# Patient Record
Sex: MALE | Race: WHITE | NOT HISPANIC OR LATINO | Employment: FULL TIME | URBAN - METROPOLITAN AREA
[De-identification: names, ages, dates, MRNs, and addresses within clinical notes are randomized per-mention and may not be internally consistent; named-entity substitution may affect disease eponyms.]

---

## 2017-01-09 ENCOUNTER — TELEPHONE (OUTPATIENT)
Dept: FAMILY MEDICINE CLINIC | Facility: CLINIC | Age: 37
End: 2017-01-09

## 2017-01-09 ENCOUNTER — OFFICE VISIT (OUTPATIENT)
Dept: FAMILY MEDICINE CLINIC | Facility: CLINIC | Age: 37
End: 2017-01-09

## 2017-01-09 VITALS
OXYGEN SATURATION: 98 % | DIASTOLIC BLOOD PRESSURE: 86 MMHG | SYSTOLIC BLOOD PRESSURE: 128 MMHG | WEIGHT: 143 LBS | HEIGHT: 66 IN | BODY MASS INDEX: 22.98 KG/M2 | HEART RATE: 114 BPM

## 2017-01-09 DIAGNOSIS — T14.8XXA BRUISING: ICD-10-CM

## 2017-01-09 DIAGNOSIS — R42 DIZZINESS: ICD-10-CM

## 2017-01-09 DIAGNOSIS — M62.838 MUSCLE SPASM: Primary | ICD-10-CM

## 2017-01-09 PROCEDURE — 99212 OFFICE O/P EST SF 10 MIN: CPT | Performed by: FAMILY MEDICINE

## 2017-01-09 NOTE — PROGRESS NOTES
Subjective   Nick Beatty is a 36 y.o. male.     History of Present Illness     He fell and hurt his back from a dizzy spell -- hit his side on a door. He's been using a heating pad. Very tight muscle. He didn't spin. More lightheadedness. Lasted for a couple of days and it went away. Didn't check his BP but his pulse was not racing. Turned as he was falling and his back hit the door knob. Then he fell on the tile floor. Having just a little pain in the right lower part of his back. 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 He's been walking around the house and the muscles are loosening up. He has used a couple of naprosyn. He thinks he'll probably be fine by Wednesday. ROM was OK.     The following portions of the patient's history were reviewed and updated as appropriate: allergies, current medications, past social history and problem list.    Review of Systems   Constitutional: Negative for activity change, appetite change, chills, fatigue, fever and unexpected weight change.   HENT: Negative for congestion, ear pain, hearing loss, mouth sores, nosebleeds, rhinorrhea and sore throat.    Eyes: Negative for pain and visual disturbance.   Respiratory: Negative for cough, shortness of breath and wheezing.    Cardiovascular: Negative for chest pain, palpitations and leg swelling.   Gastrointestinal: Positive for nausea and vomiting. Negative for abdominal distention, abdominal pain, blood in stool, constipation and diarrhea.   Endocrine: Negative for cold intolerance and heat intolerance.   Genitourinary: Negative for difficulty urinating, discharge, dysuria, frequency, hematuria and urgency.   Musculoskeletal: Positive for back pain. Negative for joint swelling.   Skin: Negative for rash and wound.   Neurological: Positive for dizziness. Negative for weakness, numbness and headaches.   Hematological: Does not bruise/bleed easily.   Psychiatric/Behavioral: Negative for confusion, dysphoric mood,  "sleep disturbance and suicidal ideas. The patient is not nervous/anxious.        Objective   Visit Vitals   • /86   • Pulse 114   • Ht 66\" (167.6 cm)   • Wt 143 lb (64.9 kg)   • SpO2 98%   • BMI 23.08 kg/m2     Physical Exam   Constitutional: He appears well-developed and well-nourished.   Musculoskeletal: He exhibits tenderness (Right outer flank along the perispinal muscle. No bruising. ). He exhibits no edema.   Psychiatric: He has a normal mood and affect. His behavior is normal. Judgment and thought content normal.   Vitals reviewed.      Assessment/Plan   Problem List Items Addressed This Visit     None      Visit Diagnoses     Muscle spasm    -  Primary    Bruising        Dizziness--resolved                 "

## 2017-01-09 NOTE — TELEPHONE ENCOUNTER
RF request for Lomarisol.  SOFIA has been placed on your desk for review.  Last Filled: 03.2016  Last OV: 12.30.16

## 2017-01-09 NOTE — MR AVS SNAPSHOT
Nick Rogerio   1/9/2017 3:45 PM   Office Visit    Provider:  Norah Newman MD   Department:  [facility] PRIMARY CARE   Dept Phone:  [phone]                Your Full Care Plan              Your Updated Medication List          This list is accurate as of: 1/9/17  4:30 PM.  Always use your most recent med list.                * gabapentin 100 MG capsule   Commonly known as:  NEURONTIN       * gabapentin 400 MG capsule   Commonly known as:  NEURONTIN   Take 1 capsule by mouth Every Night.       LORazepam 1 MG tablet   Commonly known as:  ATIVAN   Take 1 tablet (1 mg total) by mouth every 8 (eight) hours as needed for anxiety.       MULTIPLE VITAMIN PO       ondansetron 8 MG tablet   Commonly known as:  ZOFRAN   Take 1 tablet by mouth every 12 (twelve) hours as needed for nausea or vomiting.       promethazine 25 MG tablet   Commonly known as:  PHENERGAN       * Notice:  This list has 2 medication(s) that are the same as other medications prescribed for you. Read the directions carefully, and ask your doctor or other care provider to review them with you.            Medications to be Given to You by a Medical Professional     Due       Frequency    12/29/2016 cyanocobalamin injection 1,000 mcg  Every 30 Days      Instructions     None    Patient Instructions History      [employer] Signup     Our records indicate that you have an active [facility] account.    You can view your After Visit Summary by going to [facility] and logging in with your [employer] username and password.  If you don't have a [employer] username and password but a parent or guardian has access to your record, the parent or guardian should login with their own [employer] username and password and access your record to view the After Visit Summary.    If you have questions, you can email [email or web address] or call [phone] to talk to our [employer] staff.  Remember, [employer] is  "NOT to be used for urgent needs.  For medical emergencies, dial 911.               Other Info from Your Visit           Allergies     No Known Allergies      Reason for Visit     Back Pain from fall x 5 days       Vital Signs     Blood Pressure Pulse Height Weight Oxygen Saturation Body Mass Index    128/86 114 66\" (167.6 cm) 143 lb (64.9 kg) 98% 23.08 kg/m2    Smoking Status                   Current Every Day Smoker           "

## 2017-01-09 NOTE — LETTER
January 9, 2017     Patient: Nick Beatty   YOB: 1980   Date of Visit: 1/9/2017       To Whom It May Concern:    It is my medical opinion that Nick Beatty should remain out of work until Wednesday January 11. This encompasses Wednesday January 4 through January 10.       Sincerely,      Norah Newman MD    CC: No Recipients

## 2017-01-10 DIAGNOSIS — F41.8 SITUATIONAL ANXIETY: ICD-10-CM

## 2017-01-10 RX ORDER — LORAZEPAM 1 MG/1
1 TABLET ORAL EVERY 8 HOURS PRN
Qty: 10 TABLET | Refills: 0 | OUTPATIENT
Start: 2017-01-10 | End: 2019-01-24

## 2017-01-12 DIAGNOSIS — R11.0 CHRONIC NAUSEA: ICD-10-CM

## 2017-01-12 RX ORDER — ONDANSETRON HYDROCHLORIDE 8 MG/1
8 TABLET, FILM COATED ORAL EVERY 12 HOURS PRN
Qty: 30 TABLET | Refills: 0 | Status: SHIPPED | OUTPATIENT
Start: 2017-01-12 | End: 2018-03-28 | Stop reason: SDUPTHER

## 2017-04-03 ENCOUNTER — TELEPHONE (OUTPATIENT)
Dept: FAMILY MEDICINE CLINIC | Facility: CLINIC | Age: 37
End: 2017-04-03

## 2017-04-03 DIAGNOSIS — D51.0 PERNICIOUS ANEMIA: Primary | ICD-10-CM

## 2017-04-03 NOTE — TELEPHONE ENCOUNTER
04.03.17 [facility]  Patient informed.    ----- Message from Norah Newman MD sent at 4/3/2017 12:46 PM EDT -----  Regarding: RE: repeat B12 level  Please call and tell the patient that he needs a repeat B12 level.   ----- Message -----     From: Norah Newman MD     Sent: 4/3/2017       To: Norah Newman MD  Subject: repeat B12 level

## 2017-09-28 DIAGNOSIS — G62.9 NEUROPATHY: ICD-10-CM

## 2017-10-03 RX ORDER — GABAPENTIN 400 MG/1
CAPSULE ORAL
Qty: 90 CAPSULE | Refills: 1 | OUTPATIENT
Start: 2017-10-03 | End: 2018-05-22 | Stop reason: SDUPTHER

## 2018-03-21 ENCOUNTER — OFFICE VISIT (OUTPATIENT)
Dept: FAMILY MEDICINE CLINIC | Facility: CLINIC | Age: 38
End: 2018-03-21

## 2018-03-21 VITALS
HEIGHT: 66 IN | WEIGHT: 134 LBS | SYSTOLIC BLOOD PRESSURE: 124 MMHG | DIASTOLIC BLOOD PRESSURE: 82 MMHG | HEART RATE: 65 BPM | BODY MASS INDEX: 21.53 KG/M2 | OXYGEN SATURATION: 98 %

## 2018-03-21 DIAGNOSIS — R55 SYNCOPE, UNSPECIFIED SYNCOPE TYPE: Primary | ICD-10-CM

## 2018-03-21 DIAGNOSIS — I44.7 LEFT BUNDLE BRANCH BLOCK: ICD-10-CM

## 2018-03-21 PROCEDURE — 99214 OFFICE O/P EST MOD 30 MIN: CPT | Performed by: NURSE PRACTITIONER

## 2018-03-21 RX ORDER — MECLIZINE HCL 12.5 MG/1
12.5 TABLET ORAL 2 TIMES DAILY
Qty: 20 TABLET | Refills: 0 | Status: SHIPPED | OUTPATIENT
Start: 2018-03-21 | End: 2018-04-02 | Stop reason: SDUPTHER

## 2018-03-21 NOTE — PROGRESS NOTES
Nick Beatty is a 37 y.o. male.Patient has had dizzy spells and has been light headed for 2 weeks. He blacked out at work last week. Patient was seen at an urgent care and had blood work and an EKG.Feels like the room is spinning.Has a couple spells a day, is unable to go to work because he cannot drive.Is asking for a note for work to cover the weekend.      Assessment/Plan   Problem List Items Addressed This Visit     None      Visit Diagnoses     Syncope, unspecified syncope type    -  Primary    Left bundle branch block        Relevant Orders    Ambulatory Referral to Cardiology             No Follow-up on file.  Patient Instructions   Syncope  Syncope is when you temporarily lose consciousness. Syncope may also be called fainting or passing out. It is caused by a sudden decrease in blood flow to the brain. Even though most causes of syncope are not dangerous, syncope can be a sign of a serious medical problem. Signs that you may be about to faint include:  · Feeling dizzy or light-headed.  · Feeling nauseous.  · Seeing all white or all black in your field of vision.  · Having cold, clammy skin.  If you fainted, get medical help right away.Call your local emergency services (911 in the [place] Do not drive yourself to the hospital.  Follow these instructions at home:  Pay attention to any changes in your symptoms. Take these actions to help with your condition:  · Have someone stay with you until you feel stable.  · Do not drive, use machinery, or play sports until your health care provider says it is okay.  · Keep all follow-up visits as told by your health care provider. This is important.  · If you start to feel like you might faint, lie down right away and raise (elevate) your feet above the level of your heart. Breathe deeply and steadily. Wait until all of the symptoms have passed.  · Drink enough fluid to keep your urine clear or pale yellow.  · If you are taking blood pressure or heart medicine, get up  "slowly and take several minutes to sit and then stand. This can reduce dizziness.  · Take over-the-counter and prescription medicines only as told by your health care provider.  Get help right away if:  · You have a severe headache.  · You have unusual pain in your chest, abdomen, or back.  · You are bleeding from your mouth or rectum, or you have black or tarry stool.  · You have a very fast or irregular heartbeat (palpitations).  · You have pain with breathing.  · You faint once or repeatedly.  · You have a seizure.  · You are confused.  · You have trouble walking.  · You have severe weakness.  · You have vision problems.  These symptoms may represent a serious problem that is an emergency. Do not wait to see if your symptoms will go away. Get medical help right away. Call your local emergency services (911 in the [place] Do not drive yourself to the hospital.  This information is not intended to replace advice given to you by your health care provider. Make sure you discuss any questions you have with your health care provider.  Document Released: 12/18/2006 Document Revised: 05/25/2017 Document Reviewed: 08/31/2016  [employer] Interactive Patient Education © 2017 [employer]        Chief Complaint   Patient presents with   • Dizziness     Social History   Substance Use Topics   • Smoking status: Current Every Day Smoker     Types: Electronic Cigarette   • Smokeless tobacco: Never Used   • Alcohol use No       History of Present Illness     The following portions of the patient's history were reviewed and updated as appropriate:PMHroutine: Social history , Allergies, Current Medications and Active Problem List    Review of Systems   Constitutional: Negative for activity change and appetite change.   Neurological: Positive for dizziness and light-headedness.       Objective   Vitals:    03/21/18 1400   BP: 124/82   Pulse: 65   SpO2: 98%   Weight: 60.8 kg (134 lb)   Height: 167.6 cm (66\")     Body mass index is " 21.63 kg/m².  Physical Exam   Constitutional: He is oriented to person, place, and time. He appears well-developed and well-nourished. He appears distressed.   HENT:   Head: Normocephalic and atraumatic.   Right Ear: External ear normal.   Left Ear: External ear normal.   Mouth/Throat: Oropharynx is clear and moist.   Eyes: EOM are normal. Pupils are equal, round, and reactive to light.   Cardiovascular: Normal rate and regular rhythm.    Pulmonary/Chest: Effort normal and breath sounds normal.   Abdominal: Soft.   Musculoskeletal: Normal range of motion.   Neurological: He is alert and oriented to person, place, and time. He has normal reflexes. He displays normal reflexes. No cranial nerve deficit. He exhibits normal muscle tone. Coordination normal.   Skin: Skin is warm.   Psychiatric:   Anxious    Nursing note and vitals reviewed.    Reviewed Data:  No visits with results within 1 Month(s) from this visit.   Latest known visit with results is:   Office Visit on 12/30/2016   Component Date Value Ref Range Status   • Vitamin B-12 12/31/2016 >2000* 211 - 946 pg/mL Final   • Glucose 12/31/2016 81  65 - 99 mg/dL Final   • BUN 12/31/2016 13  6 - 20 mg/dL Final   • Creatinine 12/31/2016 0.53* 0.76 - 1.27 mg/dL Final   • eGFR Non African Am 12/31/2016 137  >59 mL/min/1.73 Final   • eGFR African Am 12/31/2016 158  >59 mL/min/1.73 Final   • BUN/Creatinine Ratio 12/31/2016 25* 8 - 19 Final   • Sodium 12/31/2016 143  134 - 144 mmol/L Final   • Potassium 12/31/2016 3.7  3.5 - 5.2 mmol/L Final   • Chloride 12/31/2016 104  96 - 106 mmol/L Final   • Total CO2 12/31/2016 24  18 - 29 mmol/L Final   • Calcium 12/31/2016 9.3  8.7 - 10.2 mg/dL Final   • Total Protein 12/31/2016 6.6  6.0 - 8.5 g/dL Final   • Albumin 12/31/2016 4.2  3.5 - 5.5 g/dL Final   • Globulin 12/31/2016 2.4  1.5 - 4.5 g/dL Final   • A/G Ratio 12/31/2016 1.8  1.1 - 2.5 Final   • Total Bilirubin 12/31/2016 0.5  0.0 - 1.2 mg/dL Final   • Alkaline Phosphatase  12/31/2016 99  39 - 117 IU/L Final   • AST (SGOT) 12/31/2016 37  0 - 40 IU/L Final   • ALT (SGPT) 12/31/2016 65* 0 - 44 IU/L Final   • Total Cholesterol 12/31/2016 164  100 - 199 mg/dL Final   • Triglycerides 12/31/2016 61  0 - 149 mg/dL Final   • HDL Cholesterol 12/31/2016 50  >39 mg/dL Final   • VLDL Cholesterol 12/31/2016 12  5 - 40 mg/dL Final   • LDL Cholesterol  12/31/2016 102* 0 - 99 mg/dL Final   • LDL/HDL Ratio 12/31/2016 2.0  0.0 - 3.6 ratio units Final    Comment:                                     LDL/HDL Ratio                                              Men  Women                                1/2 Avg.Risk  1.0    1.5                                    Avg.Risk  3.6    3.2                                 2X Avg.Risk  6.2    5.0                                 3X Avg.Risk  8.0    6.1     • Please note 12/31/2016 Comment   Final    Comment: The date and/or time of collection was not indicated on the  requisition as required by state and federal law.  The date of  receipt of the specimen was used as the collection date if not  supplied.     pt is new to me he usually sees Dr Newman, difficult to get a handle on what is going on when we discussed his syncopal episode.  Pt is very concerned that the cause of the episode is his heart, he is anxious during the visit, reports he cannot go to work like this, Review of his visit to the [facility] with him and his EKG. Will refer him back to cardiology and he will need to follow up with Dr Newman.

## 2018-03-22 NOTE — PATIENT INSTRUCTIONS
Syncope  Syncope is when you temporarily lose consciousness. Syncope may also be called fainting or passing out. It is caused by a sudden decrease in blood flow to the brain. Even though most causes of syncope are not dangerous, syncope can be a sign of a serious medical problem. Signs that you may be about to faint include:  · Feeling dizzy or light-headed.  · Feeling nauseous.  · Seeing all white or all black in your field of vision.  · Having cold, clammy skin.  If you fainted, get medical help right away.Call your local emergency services (911 in the [place] Do not drive yourself to the hospital.  Follow these instructions at home:  Pay attention to any changes in your symptoms. Take these actions to help with your condition:  · Have someone stay with you until you feel stable.  · Do not drive, use machinery, or play sports until your health care provider says it is okay.  · Keep all follow-up visits as told by your health care provider. This is important.  · If you start to feel like you might faint, lie down right away and raise (elevate) your feet above the level of your heart. Breathe deeply and steadily. Wait until all of the symptoms have passed.  · Drink enough fluid to keep your urine clear or pale yellow.  · If you are taking blood pressure or heart medicine, get up slowly and take several minutes to sit and then stand. This can reduce dizziness.  · Take over-the-counter and prescription medicines only as told by your health care provider.  Get help right away if:  · You have a severe headache.  · You have unusual pain in your chest, abdomen, or back.  · You are bleeding from your mouth or rectum, or you have black or tarry stool.  · You have a very fast or irregular heartbeat (palpitations).  · You have pain with breathing.  · You faint once or repeatedly.  · You have a seizure.  · You are confused.  · You have trouble walking.  · You have severe weakness.  · You have vision problems.  These symptoms  may represent a serious problem that is an emergency. Do not wait to see if your symptoms will go away. Get medical help right away. Call your local emergency services (911 in the [place] Do not drive yourself to the hospital.  This information is not intended to replace advice given to you by your health care provider. Make sure you discuss any questions you have with your health care provider.  Document Released: 12/18/2006 Document Revised: 05/25/2017 Document Reviewed: 08/31/2016  [employer] Interactive Patient Education © 2017 [employer]

## 2018-03-28 DIAGNOSIS — R11.0 CHRONIC NAUSEA: ICD-10-CM

## 2018-03-28 RX ORDER — ONDANSETRON HYDROCHLORIDE 8 MG/1
8 TABLET, FILM COATED ORAL EVERY 12 HOURS PRN
Qty: 30 TABLET | Refills: 0 | Status: SHIPPED | OUTPATIENT
Start: 2018-03-28 | End: 2018-05-23 | Stop reason: SDUPTHER

## 2018-03-30 DIAGNOSIS — F41.8 SITUATIONAL ANXIETY: ICD-10-CM

## 2018-03-30 RX ORDER — LORAZEPAM 1 MG/1
1 TABLET ORAL EVERY 8 HOURS PRN
Qty: 10 TABLET | Refills: 0 | OUTPATIENT
Start: 2018-03-30

## 2018-04-02 ENCOUNTER — OFFICE VISIT (OUTPATIENT)
Dept: FAMILY MEDICINE CLINIC | Facility: CLINIC | Age: 38
End: 2018-04-02

## 2018-04-02 VITALS
HEIGHT: 67 IN | WEIGHT: 136.1 LBS | OXYGEN SATURATION: 98 % | TEMPERATURE: 98.7 F | SYSTOLIC BLOOD PRESSURE: 120 MMHG | RESPIRATION RATE: 16 BRPM | BODY MASS INDEX: 21.36 KG/M2 | HEART RATE: 91 BPM | DIASTOLIC BLOOD PRESSURE: 78 MMHG

## 2018-04-02 DIAGNOSIS — R55 NEAR SYNCOPE: ICD-10-CM

## 2018-04-02 DIAGNOSIS — E53.8 B12 DEFICIENCY: ICD-10-CM

## 2018-04-02 DIAGNOSIS — R42 VERTIGO: Primary | ICD-10-CM

## 2018-04-02 PROBLEM — I44.7 LEFT BUNDLE BRANCH BLOCK: Status: ACTIVE | Noted: 2018-04-02

## 2018-04-02 PROCEDURE — 99213 OFFICE O/P EST LOW 20 MIN: CPT | Performed by: FAMILY MEDICINE

## 2018-04-02 RX ORDER — UBIDECARENONE 75 MG
100 CAPSULE ORAL DAILY
COMMUNITY

## 2018-04-02 RX ORDER — MECLIZINE HCL 12.5 MG/1
12.5 TABLET ORAL 2 TIMES DAILY
Qty: 20 TABLET | Refills: 0 | OUTPATIENT
Start: 2018-04-02

## 2018-04-02 RX ORDER — MECLIZINE HCL 12.5 MG/1
12.5 TABLET ORAL 2 TIMES DAILY
Qty: 40 TABLET | Refills: 0 | Status: SHIPPED | OUTPATIENT
Start: 2018-04-02 | End: 2018-04-24 | Stop reason: SDUPTHER

## 2018-04-02 NOTE — ASSESSMENT & PLAN NOTE
Lillie 4/2/2018  This has not been recently checked. Will have him return to office for labs since [facility] has already left for the day.

## 2018-04-02 NOTE — PROGRESS NOTES
Nick Beatty is a 37 y.o. male.      Assessment/Plan   Problem List Items Addressed This Visit        Digestive    B12 deficiency    Current Assessment & Plan     Lillie 4/2/2018  This has not been recently checked. Will have him return to office for labs since [facility] has already left for the day.            Other Visit Diagnoses     Vertigo    -  Primary    Relevant Orders    TSH    Vitamin B12    Near syncope        He is unsure if this is like his previous episodes w/u by cardiology in 2013. Will continue prn meclizine but if card w/u neg & spinning persists will need ENT              Return if symptoms worsen or fail to improve.  There are no Patient Instructions on file for this visit.    Chief Complaint   Patient presents with   • Loss of Consciousness     follow up to OV 3/21/2018, reports 1 time fainting in March     Social History   Substance Use Topics   • Smoking status: Current Every Day Smoker     Types: Electronic Cigarette   • Smokeless tobacco: Never Used   • Alcohol use No       History of Present Illness     He passed out at work. Couldn't drive home with it. He can't remember if this was the same as previous episodes. It has been five years since that happened. He is see Dr. Rashid in two days. He felt like the room was spinning. This occurred in Mid-March. He only had one more episode of that since and did not pass out with that. They gave him some meclizine and that seemed to help some. His blood work was normal at the [facility]. He just started retaking his oral B12 so he hasn't had any recent levels. He took his last meclizine yesterday. He also got a B12 shot at the [facility] the other day.     The following portions of the patient's history were reviewed and updated as appropriate:PMHroutine: Social history , Past Medical History, Allergies, Current Medications, Active Problem List and Health Maintenance    Review of Systems   Constitutional: Negative for activity change, appetite change, chills,  "fatigue, fever and unexpected weight change.   HENT: Negative for congestion, ear pain, hearing loss, mouth sores, nosebleeds, rhinorrhea and sore throat.    Eyes: Negative for pain and visual disturbance.   Respiratory: Positive for shortness of breath. Negative for cough and wheezing.    Cardiovascular: Negative for chest pain, palpitations and leg swelling.   Gastrointestinal: Positive for nausea and vomiting. Negative for abdominal distention, abdominal pain, blood in stool, constipation and diarrhea.   Endocrine: Negative for cold intolerance and heat intolerance.   Genitourinary: Negative for difficulty urinating, discharge, dysuria, frequency, hematuria and urgency.   Musculoskeletal: Positive for back pain. Negative for joint swelling.   Skin: Negative for rash and wound.   Neurological: Positive for dizziness, syncope, light-headedness and headaches. Negative for weakness and numbness.   Hematological: Does not bruise/bleed easily.   Psychiatric/Behavioral: Negative for confusion, dysphoric mood, sleep disturbance and suicidal ideas. The patient is not nervous/anxious.    All other systems reviewed and are negative.    Pt following up OV on 3/21/2018 c/o dizziness, fainting, headache,SOB with exertion, lower back pain, nausea and vomiting.    Objective   Vitals:    04/02/18 1634   BP: 120/78   BP Location: Right arm   Pulse: 91   Resp: 16   Temp: 98.7 °F (37.1 °C)   TempSrc: Oral   SpO2: 98%   Weight: 61.7 kg (136 lb 1.6 oz)   Height: 170.2 cm (67\")     Body mass index is 21.32 kg/m².  Physical Exam   Constitutional: He appears well-developed and well-nourished.   Psychiatric: He has a normal mood and affect. His behavior is normal. Judgment and thought content normal.   Vitals reviewed.    Reviewed Data:        "

## 2018-04-04 ENCOUNTER — OFFICE VISIT (OUTPATIENT)
Dept: CARDIOLOGY | Facility: CLINIC | Age: 38
End: 2018-04-04

## 2018-04-04 VITALS
HEART RATE: 74 BPM | HEIGHT: 66 IN | BODY MASS INDEX: 21.86 KG/M2 | SYSTOLIC BLOOD PRESSURE: 124 MMHG | WEIGHT: 136 LBS | DIASTOLIC BLOOD PRESSURE: 88 MMHG

## 2018-04-04 DIAGNOSIS — I42.9 CARDIOMYOPATHY, UNSPECIFIED TYPE (HCC): ICD-10-CM

## 2018-04-04 DIAGNOSIS — R55 SYNCOPE, UNSPECIFIED SYNCOPE TYPE: Primary | ICD-10-CM

## 2018-04-04 DIAGNOSIS — R00.2 PALPITATIONS: ICD-10-CM

## 2018-04-04 PROCEDURE — 99203 OFFICE O/P NEW LOW 30 MIN: CPT | Performed by: INTERNAL MEDICINE

## 2018-04-04 PROCEDURE — 93000 ELECTROCARDIOGRAM COMPLETE: CPT | Performed by: INTERNAL MEDICINE

## 2018-04-04 NOTE — PROGRESS NOTES
Date of Office Visit: 2018  Encounter Provider: Cory Rashid MD  Place of Service: [facility] CARDIOLOGY  Patient Name: Nick Beatty  :1980      Chief Complaint   Patient presents with   • Loss of Consciousness     History of Present Illness  The patient is a 37-year-old white male who is here today in the office for evaluation of syncope and presyncope.  He was seen in this office about 5 years ago by Dr. Contreras.  At the time he was diagnosed with a left bundle branch block which appeared to be rate induced.  He was also syncopal and presyncopal secondary to hypotension.  His antihypertensive medications were discontinued at that time.  Since then he has done well up until recently.  He noted again that he's having lightheaded episodes and near syncopal episodes that occur almost on a daily basis.  He has lost consciousness completely only one time.  He has no premonition.  He does feel palpitations associated with this.  He does not complain of any chest pain.  He does have exertional dyspnea but no fatigue issues.    I reviewed his records from  when he was here.  At that time he had left ventricular dysfunction of 40%.  It is possible that this is do to his bundle branch block but it's also possible that he may have a myopathy.      Past Medical History:   Diagnosis Date   • History of bronchitis    • Left bundle branch block     7-8 years ago   • Nausea and vomiting          No past surgical history on file.        Current Outpatient Prescriptions:   •  Cholecalciferol (VITAMIN D3 SUPER STRENGTH) 2000 units tablet, Take  by mouth., Disp: , Rfl:   •  gabapentin (NEURONTIN) 100 MG capsule, Take 100 mg by mouth daily. TAKE 1-2 CAPSULES IN AM PRN NUMBNESS AND TINGLING, Disp: , Rfl:   •  gabapentin (NEURONTIN) 400 MG capsule, TAKE 1 CAPSULE BY MOUTH EVERY NIGHT., Disp: 90 capsule, Rfl: 1  •  LORazepam (ATIVAN) 1 MG tablet, Take 1 tablet by mouth Every 8 (Eight)  Hours As Needed for anxiety., Disp: 10 tablet, Rfl: 0  •  meclizine (ANTIVERT) 12.5 MG tablet, Take 1 tablet by mouth 2 (Two) Times a Day., Disp: 40 tablet, Rfl: 0  •  MULTIPLE VITAMIN PO, Take  by mouth Daily., Disp: , Rfl:   •  ondansetron (ZOFRAN) 8 MG tablet, Take 1 tablet by mouth Every 12 (Twelve) Hours As Needed for Nausea or Vomiting., Disp: 30 tablet, Rfl: 0  •  promethazine (PHENERGAN) 25 MG tablet, Take 12.5 mg by mouth Every 8 (Eight) Hours As Needed., Disp: , Rfl:   •  vitamin B-12 (CYANOCOBALAMIN) 100 MCG tablet, Take 100 mcg by mouth Daily., Disp: , Rfl:     Current Facility-Administered Medications:   •  cyanocobalamin injection 1,000 mcg, 1,000 mcg, Intramuscular, Q30 Days, Norah eNwman MD      Social History     Social History   • Marital status:      Spouse name: N/A   • Number of children: N/A   • Years of education: N/A     Occupational History   • Not on file.     Social History Main Topics   • Smoking status: Current Every Day Smoker     Types: Electronic Cigarette   • Smokeless tobacco: Never Used   • Alcohol use No   • Drug use: No   • Sexual activity: Defer     Other Topics Concern   • Not on file     Social History Narrative   • No narrative on file         Review of Systems   Constitution: Negative.   HENT: Negative.    Eyes: Negative.    Cardiovascular: Positive for palpitations and syncope.   Respiratory: Negative.    Endocrine: Negative.    Skin: Negative.    Musculoskeletal: Negative.    Gastrointestinal: Negative.    Neurological: Positive for dizziness and light-headedness.   Psychiatric/Behavioral: Negative.        Procedures      ECG 12 Lead  Date/Time: 4/4/2018 10:42 AM  Performed by: GRACE MCKEON  Authorized by: GRACE MCKEON   Comparison: compared with previous ECG from 7/12/2013  Similar to previous ECG  Rhythm: sinus rhythm  Rate: normal  Conduction: left bundle branch block  QRS axis: normal                  Objective:    /88   Pulse 74   Ht  "167.6 cm (66\")   Wt 61.7 kg (136 lb)   BMI 21.95 kg/m²         Physical Exam   Constitutional: He is oriented to person, place, and time. He appears well-developed and well-nourished.   HENT:   Head: Normocephalic.   Eyes: Pupils are equal, round, and reactive to light.   Neck: Normal range of motion. No JVD present. Carotid bruit is not present. No thyromegaly present.   Cardiovascular: Normal rate, regular rhythm, S1 normal, S2 normal, normal heart sounds and intact distal pulses.  Exam reveals no gallop and no friction rub.    No murmur heard.  Pulmonary/Chest: Effort normal and breath sounds normal.   Abdominal: Soft. Bowel sounds are normal.   Musculoskeletal: He exhibits no edema.   Neurological: He is alert and oriented to person, place, and time.   Skin: Skin is warm, dry and intact. No erythema.   Psychiatric: He has a normal mood and affect.   Vitals reviewed.          Assessment:        1.  Left bundle branch block: Chronic.   I don't think the bundle branch block has anything to do with his symptoms unless it is associated with higher degree AV block.  An event recorder will be placed.    2.  Cardiomyopathy: Identified in 2013.  We will follow-up with repeat echocardiogram.    I explained my concerns to the patient and he is agreeable to proceed with the evaluation    I appreciate the opportunity to evaluate this patient in consultation    "

## 2018-04-07 ENCOUNTER — RESULTS ENCOUNTER (OUTPATIENT)
Dept: FAMILY MEDICINE CLINIC | Facility: CLINIC | Age: 38
End: 2018-04-07

## 2018-04-07 DIAGNOSIS — R42 VERTIGO: ICD-10-CM

## 2018-04-09 RX ORDER — HYDROXYZINE PAMOATE 25 MG/1
CAPSULE ORAL
Refills: 1 | COMMUNITY
Start: 2018-04-01 | End: 2018-04-09 | Stop reason: SDUPTHER

## 2018-04-09 RX ORDER — HYDROXYZINE PAMOATE 25 MG/1
25 CAPSULE ORAL 3 TIMES DAILY PRN
Qty: 42 CAPSULE | Refills: 1 | Status: SHIPPED | OUTPATIENT
Start: 2018-04-09 | End: 2018-05-20 | Stop reason: SDUPTHER

## 2018-04-16 ENCOUNTER — HOSPITAL ENCOUNTER (OUTPATIENT)
Dept: CARDIOLOGY | Facility: HOSPITAL | Age: 38
Discharge: HOME | End: 2018-04-16
Attending: INTERNAL MEDICINE | Admitting: INTERNAL MEDICINE

## 2018-04-16 VITALS
WEIGHT: 136 LBS | BODY MASS INDEX: 21.86 KG/M2 | HEIGHT: 66 IN | HEART RATE: 76 BPM | SYSTOLIC BLOOD PRESSURE: 137 MMHG | DIASTOLIC BLOOD PRESSURE: 89 MMHG

## 2018-04-16 DIAGNOSIS — I42.9 CARDIOMYOPATHY, UNSPECIFIED TYPE (HCC): ICD-10-CM

## 2018-04-16 LAB
BH CV ECHO MEAS - ACS: 2.1 CM
BH CV ECHO MEAS - AO MAX PG (FULL): 3.8 MMHG
BH CV ECHO MEAS - AO MAX PG: 7.1 MMHG
BH CV ECHO MEAS - AO MEAN PG (FULL): 2.3 MMHG
BH CV ECHO MEAS - AO MEAN PG: 3.9 MMHG
BH CV ECHO MEAS - AO ROOT AREA (BSA CORRECTED): 1.7
BH CV ECHO MEAS - AO ROOT AREA: 6.5 CM^2
BH CV ECHO MEAS - AO ROOT DIAM: 2.9 CM
BH CV ECHO MEAS - AO V2 MAX: 133 CM/SEC
BH CV ECHO MEAS - AO V2 MEAN: 90.5 CM/SEC
BH CV ECHO MEAS - AO V2 VTI: 26.1 CM
BH CV ECHO MEAS - AVA(I,A): 2.2 CM^2
BH CV ECHO MEAS - AVA(I,D): 2.2 CM^2
BH CV ECHO MEAS - AVA(V,A): 2.3 CM^2
BH CV ECHO MEAS - AVA(V,D): 2.3 CM^2
BH CV ECHO MEAS - BSA(HAYCOCK): 1.7 M^2
BH CV ECHO MEAS - BSA: 1.7 M^2
BH CV ECHO MEAS - BZI_BMI: 21 KILOGRAMS/M^2
BH CV ECHO MEAS - BZI_METRIC_HEIGHT: 167.6 CM
BH CV ECHO MEAS - BZI_METRIC_WEIGHT: 59 KG
BH CV ECHO MEAS - CONTRAST EF (2CH): 39.8 ML/M^2
BH CV ECHO MEAS - CONTRAST EF 4CH: 40 ML/M^2
BH CV ECHO MEAS - EDV(MOD-SP2): 108 ML
BH CV ECHO MEAS - EDV(MOD-SP4): 100 ML
BH CV ECHO MEAS - EDV(TEICH): 125.6 ML
BH CV ECHO MEAS - EF(CUBED): 35.9 %
BH CV ECHO MEAS - EF(MOD-SP2): 39.8 %
BH CV ECHO MEAS - EF(MOD-SP4): 40 %
BH CV ECHO MEAS - EF(TEICH): 29.2 %
BH CV ECHO MEAS - ESV(MOD-SP2): 65 ML
BH CV ECHO MEAS - ESV(MOD-SP4): 60 ML
BH CV ECHO MEAS - ESV(TEICH): 88.9 ML
BH CV ECHO MEAS - FS: 13.8 %
BH CV ECHO MEAS - IVS/LVPW: 0.83
BH CV ECHO MEAS - IVSD: 0.77 CM
BH CV ECHO MEAS - LAT PEAK E' VEL: 13 CM/SEC
BH CV ECHO MEAS - LV DIASTOLIC VOL/BSA (35-75): 60 ML/M^2
BH CV ECHO MEAS - LV MASS(C)D: 152.3 GRAMS
BH CV ECHO MEAS - LV MASS(C)DI: 91.4 GRAMS/M^2
BH CV ECHO MEAS - LV MAX PG: 3.3 MMHG
BH CV ECHO MEAS - LV MEAN PG: 1.6 MMHG
BH CV ECHO MEAS - LV SYSTOLIC VOL/BSA (12-30): 36 ML/M^2
BH CV ECHO MEAS - LV V1 MAX: 90.2 CM/SEC
BH CV ECHO MEAS - LV V1 MEAN: 57.1 CM/SEC
BH CV ECHO MEAS - LV V1 VTI: 17.3 CM
BH CV ECHO MEAS - LVIDD: 5.1 CM
BH CV ECHO MEAS - LVIDS: 4.4 CM
BH CV ECHO MEAS - LVLD AP2: 8.3 CM
BH CV ECHO MEAS - LVLD AP4: 8.2 CM
BH CV ECHO MEAS - LVLS AP2: 7.2 CM
BH CV ECHO MEAS - LVLS AP4: 7.5 CM
BH CV ECHO MEAS - LVOT AREA (M): 3.5 CM^2
BH CV ECHO MEAS - LVOT AREA: 3.3 CM^2
BH CV ECHO MEAS - LVOT DIAM: 2.1 CM
BH CV ECHO MEAS - LVPWD: 0.92 CM
BH CV ECHO MEAS - MED PEAK E' VEL: 6 CM/SEC
BH CV ECHO MEAS - MV A DUR: 0.1 SEC
BH CV ECHO MEAS - MV A MAX VEL: 55.8 CM/SEC
BH CV ECHO MEAS - MV DEC SLOPE: 353 CM/SEC^2
BH CV ECHO MEAS - MV DEC TIME: 0.22 SEC
BH CV ECHO MEAS - MV E MAX VEL: 77.6 CM/SEC
BH CV ECHO MEAS - MV E/A: 1.4
BH CV ECHO MEAS - MV MAX PG: 2.2 MMHG
BH CV ECHO MEAS - MV MEAN PG: 1.4 MMHG
BH CV ECHO MEAS - MV P1/2T MAX VEL: 77.6 CM/SEC
BH CV ECHO MEAS - MV P1/2T: 64.4 MSEC
BH CV ECHO MEAS - MV V2 MAX: 74.2 CM/SEC
BH CV ECHO MEAS - MV V2 MEAN: 56.1 CM/SEC
BH CV ECHO MEAS - MV V2 VTI: 16.4 CM
BH CV ECHO MEAS - MVA P1/2T LCG: 2.8 CM^2
BH CV ECHO MEAS - MVA(P1/2T): 3.4 CM^2
BH CV ECHO MEAS - MVA(VTI): 3.5 CM^2
BH CV ECHO MEAS - PA MAX PG (FULL): 2.9 MMHG
BH CV ECHO MEAS - PA MAX PG: 4.5 MMHG
BH CV ECHO MEAS - PA V2 MAX: 105.9 CM/SEC
BH CV ECHO MEAS - PULM A REVS DUR: 0.11 SEC
BH CV ECHO MEAS - PULM A REVS VEL: 30.1 CM/SEC
BH CV ECHO MEAS - PULM DIAS VEL: 43.2 CM/SEC
BH CV ECHO MEAS - PULM S/D: 0.92
BH CV ECHO MEAS - PULM SYS VEL: 39.8 CM/SEC
BH CV ECHO MEAS - PVA(V,A): 1.7 CM^2
BH CV ECHO MEAS - PVA(V,D): 1.7 CM^2
BH CV ECHO MEAS - QP/QS: 0.56
BH CV ECHO MEAS - RAP SYSTOLE: 3 MMHG
BH CV ECHO MEAS - RV MAX PG: 1.5 MMHG
BH CV ECHO MEAS - RV MEAN PG: 0.72 MMHG
BH CV ECHO MEAS - RV V1 MAX: 62.1 CM/SEC
BH CV ECHO MEAS - RV V1 MEAN: 37.8 CM/SEC
BH CV ECHO MEAS - RV V1 VTI: 11.3 CM
BH CV ECHO MEAS - RVOT AREA: 2.8 CM^2
BH CV ECHO MEAS - RVOT DIAM: 1.9 CM
BH CV ECHO MEAS - RVSP: 10 MMHG
BH CV ECHO MEAS - SI(AO): 102.4 ML/M^2
BH CV ECHO MEAS - SI(CUBED): 29.1 ML/M^2
BH CV ECHO MEAS - SI(LVOT): 34.6 ML/M^2
BH CV ECHO MEAS - SI(MOD-SP2): 25.8 ML/M^2
BH CV ECHO MEAS - SI(MOD-SP4): 24 ML/M^2
BH CV ECHO MEAS - SI(TEICH): 22.1 ML/M^2
BH CV ECHO MEAS - SUP REN AO DIAM: 1.9 CM
BH CV ECHO MEAS - SV(AO): 170.6 ML
BH CV ECHO MEAS - SV(CUBED): 48.5 ML
BH CV ECHO MEAS - SV(LVOT): 57.6 ML
BH CV ECHO MEAS - SV(MOD-SP2): 43 ML
BH CV ECHO MEAS - SV(MOD-SP4): 40 ML
BH CV ECHO MEAS - SV(RVOT): 32.1 ML
BH CV ECHO MEAS - SV(TEICH): 36.7 ML
BH CV ECHO MEAS - TAPSE (>1.6): 1.8 CM2
BH CV ECHO MEAS - TR MAX VEL: 131.7 CM/SEC
BH CV XLRA - RV BASE: 2.9 CM
BH CV XLRA - TDI S': 12 CM/SEC
E/E' RATIO: 10
LEFT ATRIUM VOLUME INDEX: 18 ML/M2
MAXIMAL PREDICTED HEART RATE: 184 BPM
STRESS TARGET HR: 156 BPM

## 2018-04-16 PROCEDURE — 93306 TTE W/DOPPLER COMPLETE: CPT

## 2018-04-16 PROCEDURE — 93306 TTE W/DOPPLER COMPLETE: CPT | Performed by: INTERNAL MEDICINE

## 2018-04-24 RX ORDER — MECLIZINE HCL 12.5 MG/1
12.5 TABLET ORAL 2 TIMES DAILY
Qty: 40 TABLET | Refills: 0 | Status: SHIPPED | OUTPATIENT
Start: 2018-04-24 | End: 2018-05-22 | Stop reason: SDUPTHER

## 2018-05-21 RX ORDER — HYDROXYZINE PAMOATE 25 MG/1
25 CAPSULE ORAL 3 TIMES DAILY PRN
Qty: 42 CAPSULE | Refills: 1 | Status: SHIPPED | OUTPATIENT
Start: 2018-05-21 | End: 2018-09-14 | Stop reason: SDUPTHER

## 2018-05-22 DIAGNOSIS — G62.9 NEUROPATHY: ICD-10-CM

## 2018-05-22 RX ORDER — MECLIZINE HCL 12.5 MG/1
12.5 TABLET ORAL 2 TIMES DAILY
Qty: 60 TABLET | Refills: 0 | Status: SHIPPED | OUTPATIENT
Start: 2018-05-22 | End: 2018-07-30 | Stop reason: SDUPTHER

## 2018-05-22 RX ORDER — GABAPENTIN 400 MG/1
400 CAPSULE ORAL NIGHTLY
Qty: 90 CAPSULE | Refills: 5 | Status: SHIPPED | OUTPATIENT
Start: 2018-05-22 | End: 2018-12-03 | Stop reason: SDUPTHER

## 2018-05-22 NOTE — TELEPHONE ENCOUNTER
----- Message from Norah Newman MD sent at 5/22/2018  2:42 PM EDT -----  Regarding: FW: Prescription Question  Contact: [phone]  OK for six months.   ----- Message -----  From: Nick Beatty  Sent: 5/22/2018   2:37 PM  To: Norah Newman MD  Subject: Prescription Question                            Gabapentin 400 mg is due renewal as well

## 2018-05-23 DIAGNOSIS — R11.0 CHRONIC NAUSEA: ICD-10-CM

## 2018-05-23 RX ORDER — ONDANSETRON HYDROCHLORIDE 8 MG/1
8 TABLET, FILM COATED ORAL EVERY 12 HOURS PRN
Qty: 30 TABLET | Refills: 1 | Status: SHIPPED | OUTPATIENT
Start: 2018-05-23 | End: 2018-09-14 | Stop reason: SDUPTHER

## 2018-06-20 ENCOUNTER — OFFICE VISIT (OUTPATIENT)
Dept: CARDIOLOGY | Facility: CLINIC | Age: 38
End: 2018-06-20

## 2018-06-20 VITALS
BODY MASS INDEX: 22.13 KG/M2 | DIASTOLIC BLOOD PRESSURE: 82 MMHG | HEIGHT: 67 IN | HEART RATE: 75 BPM | SYSTOLIC BLOOD PRESSURE: 132 MMHG | WEIGHT: 141 LBS

## 2018-06-20 DIAGNOSIS — I42.9 CARDIOMYOPATHY, UNSPECIFIED TYPE (HCC): Primary | ICD-10-CM

## 2018-06-20 DIAGNOSIS — I10 BENIGN ESSENTIAL HYPERTENSION: ICD-10-CM

## 2018-06-20 DIAGNOSIS — I44.7 LEFT BUNDLE BRANCH BLOCK: ICD-10-CM

## 2018-06-20 PROCEDURE — 99213 OFFICE O/P EST LOW 20 MIN: CPT | Performed by: INTERNAL MEDICINE

## 2018-06-20 PROCEDURE — 93000 ELECTROCARDIOGRAM COMPLETE: CPT | Performed by: INTERNAL MEDICINE

## 2018-06-20 NOTE — PROGRESS NOTES
Date of Office Visit: 2018  Encounter Provider: Cory Rashid MD  Place of Service: [facility] CARDIOLOGY  Patient Name: Nick Beatty  :1980      Chief Complaint   Patient presents with   • Loss of Consciousness     History of Present Illness  The patient is a 37-year-old white male with a history of left bundle branch block and syncope.  Since he was seen in the office 2 months ago there have been no recurrent episodes of syncope.  Occasionally he states he will feel slightly lightheaded but he's never lost consciousness.  He denies any chest pain does complain of some fatigue intermittently and occasionally some shortness of breath.  He is very active physically at his job.  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    I reviewed his echocardiogram and it appears that he has mild left ventricular dysfunction with his ejection fraction about 40%.  He does have septal wall motion abnormality which is consistent with his left bundle branch block.    Past Medical History:   Diagnosis Date   • History of bronchitis    • Left bundle branch block     7-8 years ago   • Nausea and vomiting          No past surgical history on file.        Current Outpatient Prescriptions:   •  Cholecalciferol (VITAMIN D3 SUPER STRENGTH) 2000 units tablet, Take  by mouth., Disp: , Rfl:   •  gabapentin (NEURONTIN) 400 MG capsule, Take 1 capsule by mouth Every Night., Disp: 90 capsule, Rfl: 5  •  hydrOXYzine (VISTARIL) 25 MG capsule, TAKE 1 CAPSULE BY MOUTH 3 (THREE) TIMES A DAY AS NEEDED FOR ITCHING., Disp: 42 capsule, Rfl: 1  •  LORazepam (ATIVAN) 1 MG tablet, Take 1 tablet by mouth Every 8 (Eight) Hours As Needed for anxiety., Disp: 10 tablet, Rfl: 0  •  meclizine (ANTIVERT) 12.5 MG tablet, Take 1 tablet by mouth 2 (Two) Times a Day. NO MORE REFILLS UNTIL SEEN, Disp: 60 tablet, Rfl: 0  •  MULTIPLE VITAMIN PO, Take  by mouth Daily., Disp: , Rfl:   •  ondansetron (ZOFRAN) 8 MG tablet, Take 1 tablet  "by mouth Every 12 (Twelve) Hours As Needed for Nausea or Vomiting., Disp: 30 tablet, Rfl: 1  •  promethazine (PHENERGAN) 25 MG tablet, Take 12.5 mg by mouth Every 8 (Eight) Hours As Needed., Disp: , Rfl:   •  vitamin B-12 (CYANOCOBALAMIN) 100 MCG tablet, Take 100 mcg by mouth Daily., Disp: , Rfl:     Current Facility-Administered Medications:   •  cyanocobalamin injection 1,000 mcg, 1,000 mcg, Intramuscular, Q30 Days, Norah Newman MD      Social History     Social History   • Marital status:      Spouse name: N/A   • Number of children: N/A   • Years of education: N/A     Occupational History   • Not on file.     Social History Main Topics   • Smoking status: Current Every Day Smoker     Types: Electronic Cigarette   • Smokeless tobacco: Never Used   • Alcohol use No   • Drug use: No   • Sexual activity: Defer     Other Topics Concern   • Not on file     Social History Narrative   • No narrative on file         Review of Systems   Constitution: Positive for malaise/fatigue.   HENT: Negative.    Eyes: Negative.    Cardiovascular: Positive for dyspnea on exertion.   Respiratory: Negative.    Endocrine: Negative.    Skin: Negative.    Musculoskeletal: Negative.    Gastrointestinal: Negative.    Neurological: Negative.    Psychiatric/Behavioral: Negative.        Procedures      ECG 12 Lead  Date/Time: 6/20/2018 2:24 PM  Performed by: GRACE MCKEON  Authorized by: GRACE MCKEON   Comparison: compared with previous ECG from 4/4/2018  Similar to previous ECG  Rhythm: sinus rhythm  Rate: normal  Conduction: left bundle branch block                Objective:    /82   Pulse 75   Ht 170.2 cm (67\")   Wt 64 kg (141 lb)   BMI 22.08 kg/m²         Physical Exam   Constitutional: He is oriented to person, place, and time. He appears well-developed and well-nourished.   HENT:   Head: Normocephalic.   Eyes: Pupils are equal, round, and reactive to light.   Neck: Normal range of motion. No JVD present. " Carotid bruit is not present. No thyromegaly present.   Cardiovascular: Normal rate, regular rhythm, S1 normal, S2 normal, normal heart sounds and intact distal pulses.  Exam reveals no gallop and no friction rub.    No murmur heard.  Pulmonary/Chest: Effort normal and breath sounds normal.   Abdominal: Soft. Bowel sounds are normal.   Musculoskeletal: He exhibits no edema.   Neurological: He is alert and oriented to person, place, and time.   Skin: Skin is warm, dry and intact. No erythema.   Psychiatric: He has a normal mood and affect.   Vitals reviewed.          Assessment:       Diagnosis Plan   1. Cardiomyopathy, unspecified type     2. Benign essential hypertension     3. Left bundle branch block       This patient appears to have a nonspecific cardiomyopathy with mild left ventricular dysfunction.  Some of this could be related to his left bundle branch block.  He is relatively asymptomatic at this point.  I'm just going to follow things along at this time.  He will follow back up in 6 months.

## 2018-07-12 PROBLEM — I42.9 CARDIOMYOPATHY, IDIOPATHIC: Status: ACTIVE | Noted: 2018-07-12

## 2018-07-30 ENCOUNTER — OFFICE VISIT (OUTPATIENT)
Dept: FAMILY MEDICINE CLINIC | Facility: CLINIC | Age: 38
End: 2018-07-30

## 2018-07-30 VITALS
BODY MASS INDEX: 21.66 KG/M2 | SYSTOLIC BLOOD PRESSURE: 124 MMHG | HEIGHT: 67 IN | OXYGEN SATURATION: 99 % | HEART RATE: 81 BPM | WEIGHT: 138 LBS | RESPIRATION RATE: 16 BRPM | DIASTOLIC BLOOD PRESSURE: 86 MMHG

## 2018-07-30 DIAGNOSIS — R42 DIZZINESS: Primary | ICD-10-CM

## 2018-07-30 PROCEDURE — 90715 TDAP VACCINE 7 YRS/> IM: CPT | Performed by: FAMILY MEDICINE

## 2018-07-30 PROCEDURE — 90471 IMMUNIZATION ADMIN: CPT | Performed by: FAMILY MEDICINE

## 2018-07-30 PROCEDURE — 99212 OFFICE O/P EST SF 10 MIN: CPT | Performed by: FAMILY MEDICINE

## 2018-07-30 RX ORDER — MECLIZINE HCL 12.5 MG/1
12.5 TABLET ORAL 2 TIMES DAILY
Qty: 60 TABLET | Refills: 5 | Status: SHIPPED | OUTPATIENT
Start: 2018-07-30 | End: 2019-02-28 | Stop reason: SDUPTHER

## 2018-07-30 NOTE — PROGRESS NOTES
Problem List Items Addressed This Visit     None      Visit Diagnoses     Dizziness    -  Primary    Relevant Medications    meclizine (ANTIVERT) 12.5 MG tablet             Return if symptoms worsen or fail to improve.  There are no Patient Instructions on file for this visit.  Nick Beatty is a 38 y.o. male being seen in our office today for Dizziness and Med Refill                 He  reports that he has been smoking Electronic Cigarette.  He has never used smokeless tobacco. He reports that he does not drink alcohol or use drugs.             HPI He is using a meclizine at work and that seems to keep the symptoms away. It helps with the dizziness and the anxiety. Doesn't usually use it at home. Would like refills.              The following portions of the patient's history were reviewed and updated as appropriate:PMHroutine: Social history , Allergies, Current Medications, Active Problem List and Health Maintenance            Review of Systems   Constitutional: Positive for fatigue. Negative for activity change, appetite change, chills, fever and unexpected weight change.   HENT: Negative for congestion, ear pain, hearing loss, mouth sores, nosebleeds, rhinorrhea and sore throat.    Eyes: Negative for pain and visual disturbance.   Respiratory: Negative for cough, shortness of breath and wheezing.    Cardiovascular: Negative for chest pain, palpitations and leg swelling.   Gastrointestinal: Negative for abdominal distention, abdominal pain, blood in stool, constipation, diarrhea, nausea and vomiting.   Endocrine: Negative for cold intolerance and heat intolerance.   Genitourinary: Negative for difficulty urinating, discharge, dysuria, frequency, hematuria and urgency.   Musculoskeletal: Positive for back pain. Negative for joint swelling.   Skin: Negative for rash and wound.   Neurological: Positive for dizziness and headaches. Negative for weakness and numbness.   Hematological: Does not bruise/bleed easily.    Psychiatric/Behavioral: Negative for confusion, dysphoric mood, sleep disturbance and suicidal ideas. The patient is not nervous/anxious.                  BP Readings from Last 1 Encounters:   07/30/18 124/86     Wt Readings from Last 3 Encounters:   07/30/18 62.6 kg (138 lb)   06/20/18 64 kg (141 lb)   04/16/18 61.7 kg (136 lb)   Body mass index is 21.61 kg/m².                 Physical Exam   Constitutional: He appears well-developed and well-nourished.   Psychiatric: He has a normal mood and affect. His behavior is normal. Judgment and thought content normal.   Vitals reviewed.

## 2018-09-10 ENCOUNTER — OFFICE VISIT (OUTPATIENT)
Dept: FAMILY MEDICINE CLINIC | Facility: CLINIC | Age: 38
End: 2018-09-10

## 2018-09-10 VITALS
BODY MASS INDEX: 20.88 KG/M2 | OXYGEN SATURATION: 99 % | HEART RATE: 82 BPM | SYSTOLIC BLOOD PRESSURE: 122 MMHG | HEIGHT: 67 IN | WEIGHT: 133 LBS | RESPIRATION RATE: 16 BRPM | DIASTOLIC BLOOD PRESSURE: 80 MMHG

## 2018-09-10 DIAGNOSIS — H93.8X3 CLOGGED EAR, BILATERAL: ICD-10-CM

## 2018-09-10 DIAGNOSIS — K58.1 IRRITABLE BOWEL SYNDROME WITH CONSTIPATION: Primary | ICD-10-CM

## 2018-09-10 DIAGNOSIS — H93.13 TINNITUS OF BOTH EARS: ICD-10-CM

## 2018-09-10 PROCEDURE — 69210 REMOVE IMPACTED EAR WAX UNI: CPT | Performed by: FAMILY MEDICINE

## 2018-09-10 PROCEDURE — 99213 OFFICE O/P EST LOW 20 MIN: CPT | Performed by: FAMILY MEDICINE

## 2018-09-10 NOTE — ASSESSMENT & PLAN NOTE
Lillie 9/10/2018  Had cscope in the past for this. Doesn't remember if he had diarrhea or constipation in the past.

## 2018-09-10 NOTE — PROGRESS NOTES
Problem List Items Addressed This Visit        Digestive    Irritable bowel syndrome - Primary    Current Assessment & Plan     Lillie 9/10/2018  Had cscope in the past for this. Doesn't remember if he had diarrhea or constipation in the past.            Other Visit Diagnoses     Clogged ear, bilateral        Tinnitus of both ears                 Return if symptoms worsen or fail to improve.  Patient Instructions   Get Benefiber and put it in your vitamin water.     Nick Beatty is a 38 y.o. male being seen in our office today for Constipation; Dizziness; and Tinnitus                 He  reports that he has been smoking Electronic Cigarette.  He has never used smokeless tobacco. He reports that he does not drink alcohol or use drugs.             HPI Needs papers filled out for FMLA papers with his intermittent episodes of     He is having irreg bowel movements, used to be very regular. When he does go the BMs are stringy. Used some stool softener. No new medications. Uses the zofran maybe every 2-3 days. Diet eats twice daily, breakfast bar in the am, couple of bars at lunch. Usually gets some vegetables. Drinks vitamin water -- three or four of those daily. Moving as much as he ever was at work. He is tired a lot so he doesn't do much at home. He hasn't tried any miralax. The stool softener helped some. No change in caffeine intake. No change in his nicotine intake.     Breathing is better since not smoking reg cigarettes.     Not using allergy medications.     Has tinnitis in both ears but worse in the left ear, he sometimes feels muffled with his hearing. He did have wax in his ears sometimes. Using some wax removal tools and that helps.              The following portions of the patient's history were reviewed and updated as appropriate:PMHroutine: Social history , Allergies, Current Medications, Active Problem List and Health Maintenance            Review of Systems   Constitutional: Negative for activity  change, appetite change, chills, fatigue, fever and unexpected weight change.   HENT: Positive for ear pain. Negative for congestion, hearing loss, mouth sores, nosebleeds, rhinorrhea and sore throat.    Eyes: Negative for pain and visual disturbance.   Respiratory: Negative for cough, shortness of breath and wheezing.    Cardiovascular: Negative for chest pain, palpitations and leg swelling.   Gastrointestinal: Positive for blood in stool, constipation, diarrhea and nausea. Negative for abdominal distention, abdominal pain and vomiting.   Endocrine: Negative for cold intolerance and heat intolerance.   Genitourinary: Negative for difficulty urinating, discharge, dysuria, frequency, hematuria and urgency.   Musculoskeletal: Positive for back pain. Negative for joint swelling.   Skin: Negative for rash and wound.   Neurological: Positive for dizziness and numbness. Negative for weakness and headaches.   Hematological: Does not bruise/bleed easily.   Psychiatric/Behavioral: Negative for confusion, dysphoric mood, sleep disturbance and suicidal ideas. The patient is not nervous/anxious.                  BP Readings from Last 1 Encounters:   09/10/18 122/80     Wt Readings from Last 3 Encounters:   09/10/18 60.3 kg (133 lb)   07/30/18 62.6 kg (138 lb)   06/20/18 64 kg (141 lb)   Body mass index is 20.83 kg/m².                 Physical Exam   Constitutional: He appears well-developed and well-nourished.   HENT:   Right Ear: External ear normal.   Left Ear: External ear normal.   Bilateral cerumenosis   Psychiatric: He has a normal mood and affect. His behavior is normal. Judgment and thought content normal.   Vitals reviewed.

## 2018-09-14 DIAGNOSIS — R11.0 CHRONIC NAUSEA: ICD-10-CM

## 2018-09-17 RX ORDER — ONDANSETRON HYDROCHLORIDE 8 MG/1
8 TABLET, FILM COATED ORAL EVERY 12 HOURS PRN
Qty: 30 TABLET | Refills: 1 | Status: SHIPPED | OUTPATIENT
Start: 2018-09-17 | End: 2018-11-16 | Stop reason: SDUPTHER

## 2018-09-17 RX ORDER — HYDROXYZINE PAMOATE 25 MG/1
25 CAPSULE ORAL 3 TIMES DAILY PRN
Qty: 42 CAPSULE | Refills: 0 | Status: SHIPPED | OUTPATIENT
Start: 2018-09-17 | End: 2018-11-14 | Stop reason: SDUPTHER

## 2018-11-15 RX ORDER — HYDROXYZINE PAMOATE 25 MG/1
25 CAPSULE ORAL 3 TIMES DAILY PRN
Qty: 42 CAPSULE | Refills: 0 | Status: SHIPPED | OUTPATIENT
Start: 2018-11-15 | End: 2018-12-14 | Stop reason: SDUPTHER

## 2018-11-16 DIAGNOSIS — R11.0 CHRONIC NAUSEA: ICD-10-CM

## 2018-11-19 RX ORDER — ONDANSETRON HYDROCHLORIDE 8 MG/1
8 TABLET, FILM COATED ORAL EVERY 12 HOURS PRN
Qty: 30 TABLET | Refills: 1 | Status: SHIPPED | OUTPATIENT
Start: 2018-11-19 | End: 2019-01-16 | Stop reason: SDUPTHER

## 2018-12-03 DIAGNOSIS — G62.9 NEUROPATHY: ICD-10-CM

## 2018-12-04 RX ORDER — GABAPENTIN 400 MG/1
CAPSULE ORAL
Qty: 90 CAPSULE | Refills: 1 | Status: SHIPPED | OUTPATIENT
Start: 2018-12-04 | End: 2019-07-13 | Stop reason: SDUPTHER

## 2018-12-14 RX ORDER — HYDROXYZINE PAMOATE 25 MG/1
25 CAPSULE ORAL 3 TIMES DAILY PRN
Qty: 42 CAPSULE | Refills: 0 | Status: SHIPPED | OUTPATIENT
Start: 2018-12-14 | End: 2019-01-16 | Stop reason: SDUPTHER

## 2018-12-19 ENCOUNTER — OFFICE VISIT (OUTPATIENT)
Dept: FAMILY MEDICINE CLINIC | Facility: CLINIC | Age: 38
End: 2018-12-19

## 2018-12-19 VITALS
OXYGEN SATURATION: 98 % | DIASTOLIC BLOOD PRESSURE: 80 MMHG | WEIGHT: 139 LBS | BODY MASS INDEX: 21.82 KG/M2 | SYSTOLIC BLOOD PRESSURE: 120 MMHG | HEART RATE: 101 BPM | HEIGHT: 67 IN

## 2018-12-19 DIAGNOSIS — H61.23 BILATERAL IMPACTED CERUMEN: Primary | ICD-10-CM

## 2018-12-19 PROCEDURE — 99213 OFFICE O/P EST LOW 20 MIN: CPT | Performed by: NURSE PRACTITIONER

## 2018-12-19 NOTE — PROGRESS NOTES
"Nick Beatty is a 38 y.o. male.Nick has had nasal congestion and sore throat for 4 days. He is using cough drops.      Assessment/Plan   Problem List Items Addressed This Visit     None      Visit Diagnoses     Bilateral impacted cerumen    -  Primary    Relevant Orders    Ear Cerumen Removal Lavage             No Follow-up on file.  There are no Patient Instructions on file for this visit.    No chief complaint on file.    Social History     Tobacco Use   • Smoking status: Former Smoker     Years: 15.00     Types: Electronic Cigarette     Start date: 6/11/1999     Last attempt to quit: 8/8/2015     Years since quitting: 3.4   • Smokeless tobacco: Never Used   Substance Use Topics   • Alcohol use: No   • Drug use: No       History of Present Illness     The following portions of the patient's history were reviewed and updated as appropriate:PMHroutine: Social history , Allergies, Current Medications, Active Problem List and Health Maintenance    Review of Systems   HENT: Positive for sore throat. Negative for congestion, drooling, ear discharge, ear pain and trouble swallowing.    Respiratory: Positive for cough and stridor. Negative for shortness of breath.    Gastrointestinal: Negative for abdominal pain, diarrhea and vomiting.   Musculoskeletal: Negative for neck pain.   Neurological: Negative for headaches.       Objective   Vitals:    12/19/18 1544   BP: 120/80   Pulse: 101   SpO2: 98%   Weight: 63 kg (139 lb)   Height: 170.2 cm (67\")     Body mass index is 21.77 kg/m².  Physical Exam   Constitutional: He appears well-developed and well-nourished. No distress.   HENT:   Head: Normocephalic and atraumatic.   Mouth/Throat: Oropharynx is clear and moist.   Bilateral cerumen impaction   Eyes: EOM are normal. Pupils are equal, round, and reactive to light.   Neck: Neck supple.   Cardiovascular: Normal rate and regular rhythm.   Pulmonary/Chest: Effort normal and breath sounds normal.   Musculoskeletal: Normal range of " motion.   Neurological: He is alert.   Skin: Skin is warm.   Nursing note and vitals reviewed.    Reviewed Data:  No visits with results within 1 Month(s) from this visit.   Latest known visit with results is:   Hospital Outpatient Visit on 04/16/2018   Component Date Value Ref Range Status   • TDI S' 04/16/2018 12.00  cm/sec Final   • RV Base 04/16/2018 2.90  cm Final   • E/E' ratio 04/16/2018 10.0   Final   • LA Volume Index 04/16/2018 18.0  mL/m2 Final   • Lat Peak E' Ulices 04/16/2018 13.0  cm/sec Final   • Med Peak E' Ulices 04/16/2018 6.00  cm/sec Final   • RAP systole 04/16/2018 3  mmHg Final   • RVSP(TR) 04/16/2018 10  mmHg Final   • Abdo Ao Diam 04/16/2018 1.90  cm Final   • TAPSE (>1.6) 04/16/2018 1.80  cm2 Final   • BSA 04/16/2018 1.7  m^2 Final   • IVSd 04/16/2018 0.77  cm Final   • LVIDd 04/16/2018 5.1  cm Final   • LVIDs 04/16/2018 4.4  cm Final   • LVPWd 04/16/2018 0.92  cm Final   • IVS/LVPW 04/16/2018 0.83   Final   • FS 04/16/2018 13.8  % Final   • EDV(Teich) 04/16/2018 125.6  ml Final   • ESV(Teich) 04/16/2018 88.9  ml Final   • EF(Teich) 04/16/2018 29.2  % Final   • EF(cubed) 04/16/2018 35.9  % Final   • LV mass(C)d 04/16/2018 152.3  grams Final   • LV mass(C)dI 04/16/2018 91.4  grams/m^2 Final   • SV(Teich) 04/16/2018 36.7  ml Final   • SI(Teich) 04/16/2018 22.1  ml/m^2 Final   • SV(cubed) 04/16/2018 48.5  ml Final   • SI(cubed) 04/16/2018 29.1  ml/m^2 Final   • Ao root diam 04/16/2018 2.9  cm Final   • Ao root area 04/16/2018 6.5  cm^2 Final   • ACS 04/16/2018 2.1  cm Final   • LVOT diam 04/16/2018 2.1  cm Final   • LVOT area 04/16/2018 3.3  cm^2 Final   • LVOT area(traced) 04/16/2018 3.5  cm^2 Final   • RVOT diam 04/16/2018 1.9  cm Final   • RVOT area 04/16/2018 2.8  cm^2 Final   • LVLd ap4 04/16/2018 8.2  cm Final   • EDV(MOD-sp4) 04/16/2018 100.0  ml Final   • LVLs ap4 04/16/2018 7.5  cm Final   • ESV(MOD-sp4) 04/16/2018 60.0  ml Final   • EF(MOD-sp4) 04/16/2018 40.0  % Final   • LVLd ap2  04/16/2018 8.3  cm Final   • EDV(MOD-sp2) 04/16/2018 108.0  ml Final   • LVLs ap2 04/16/2018 7.2  cm Final   • ESV(MOD-sp2) 04/16/2018 65.0  ml Final   • EF(MOD-sp2) 04/16/2018 39.8  % Final   • SV(MOD-sp4) 04/16/2018 40.0  ml Final   • SI(MOD-sp4) 04/16/2018 24.0  ml/m^2 Final   • SV(MOD-sp2) 04/16/2018 43.0  ml Final   • SI(MOD-sp2) 04/16/2018 25.8  ml/m^2 Final   • Ao root area (BSA corrected) 04/16/2018 1.7   Final   • EF - Contrast (2Ch) 04/16/2018 39.8  ml/m^2 Final   • EF - Contrast (4Ch) 04/16/2018 40.0  ml/m^2 Final   • LV Howard Vol (BSA corrected) 04/16/2018 60.0  ml/m^2 Final   • LV Sys Vol (BSA corrected) 04/16/2018 36.0  ml/m^2 Final   • MV A dur 04/16/2018 0.1  sec Final   • MV E max dariel 04/16/2018 77.6  cm/sec Final   • MV A max dariel 04/16/2018 55.8  cm/sec Final   • MV E/A 04/16/2018 1.4   Final   • MV V2 max 04/16/2018 74.2  cm/sec Final   • MV max PG 04/16/2018 2.2  mmHg Final   • MV V2 mean 04/16/2018 56.1  cm/sec Final   • MV mean PG 04/16/2018 1.4  mmHg Final   • MV V2 VTI 04/16/2018 16.4  cm Final   • MVA(VTI) 04/16/2018 3.5  cm^2 Final   • MV P1/2t max dariel 04/16/2018 77.6  cm/sec Final   • MV P1/2t 04/16/2018 64.4  msec Final   • MVA(P1/2t) 04/16/2018 3.4  cm^2 Final   • MV dec slope 04/16/2018 353.0  cm/sec^2 Final   • MV dec time 04/16/2018 0.22  sec Final   • Ao pk dariel 04/16/2018 133.0  cm/sec Final   • Ao max PG 04/16/2018 7.1  mmHg Final   • Ao max PG (full) 04/16/2018 3.8  mmHg Final   • Ao V2 mean 04/16/2018 90.5  cm/sec Final   • Ao mean PG 04/16/2018 3.9  mmHg Final   • Ao mean PG (full) 04/16/2018 2.3  mmHg Final   • Ao V2 VTI 04/16/2018 26.1  cm Final   • SACHA(I,A) 04/16/2018 2.2  cm^2 Final   • SACHA(I,D) 04/16/2018 2.2  cm^2 Final   • SACHA(V,A) 04/16/2018 2.3  cm^2 Final   • SACHA(V,D) 04/16/2018 2.3  cm^2 Final   • LV V1 max PG 04/16/2018 3.3  mmHg Final   • LV V1 mean PG 04/16/2018 1.6  mmHg Final   • LV V1 max 04/16/2018 90.2  cm/sec Final   • LV V1 mean 04/16/2018 57.1  cm/sec Final    • LV V1 VTI 04/16/2018 17.3  cm Final   • SV(Ao) 04/16/2018 170.6  ml Final   • SI(Ao) 04/16/2018 102.4  ml/m^2 Final   • SV(LVOT) 04/16/2018 57.6  ml Final   • SV(RVOT) 04/16/2018 32.1  ml Final   • SI(LVOT) 04/16/2018 34.6  ml/m^2 Final   • PA V2 max 04/16/2018 105.9  cm/sec Final   • PA max PG 04/16/2018 4.5  mmHg Final   • PA max PG (full) 04/16/2018 2.9  mmHg Final   • BH CV ECHO FRED - PVA(V,A) 04/16/2018 1.7  cm^2 Final   • BH CV ECHO FRED - PVA(V,D) 04/16/2018 1.7  cm^2 Final   • RV V1 max PG 04/16/2018 1.5  mmHg Final   • RV V1 mean PG 04/16/2018 0.72  mmHg Final   • RV V1 max 04/16/2018 62.1  cm/sec Final   • RV V1 mean 04/16/2018 37.8  cm/sec Final   • RV V1 VTI 04/16/2018 11.3  cm Final   • TR max ulices 04/16/2018 131.7  cm/sec Final   • Pulm Sys Ulices 04/16/2018 39.8  cm/sec Final   • Pulm Howard Ulices 04/16/2018 43.2  cm/sec Final   • Pulm S/D 04/16/2018 0.92   Final   • Qp/Qs 04/16/2018 0.56   Final   • Pulm A Revs Dur 04/16/2018 0.11  sec Final   • Pulm A Revs Ulices 04/16/2018 30.1  cm/sec Final   • MVA P1/2T LCG 04/16/2018 2.8  cm^2 Final   • BH CV ECHO FRED - BZI_BMI 04/16/2018 21.0  kilograms/m^2 Final   • BH CV ECHO FRED - BSA([place]OCK) 04/16/2018 1.7  m^2 Final   • BH CV ECHO FRED - BZI_METRIC_WEIGHT 04/16/2018 59.0  kg Final   • BH CV ECHO FRED - BZI_METRIC_HEIGHT 04/16/2018 167.6  cm Final   • Target HR (85%) 04/16/2018 156  bpm Final   • Max. Pred. HR (100%) 04/16/2018 184  bpm Final

## 2019-01-16 DIAGNOSIS — R11.0 CHRONIC NAUSEA: ICD-10-CM

## 2019-01-17 RX ORDER — ONDANSETRON HYDROCHLORIDE 8 MG/1
8 TABLET, FILM COATED ORAL EVERY 12 HOURS PRN
Qty: 30 TABLET | Refills: 1 | Status: SHIPPED | OUTPATIENT
Start: 2019-01-17 | End: 2019-04-13 | Stop reason: SDUPTHER

## 2019-01-17 RX ORDER — HYDROXYZINE PAMOATE 25 MG/1
25 CAPSULE ORAL 3 TIMES DAILY PRN
Qty: 42 CAPSULE | Refills: 0 | Status: SHIPPED | OUTPATIENT
Start: 2019-01-17 | End: 2019-02-28 | Stop reason: SDUPTHER

## 2019-01-24 ENCOUNTER — OFFICE VISIT (OUTPATIENT)
Dept: CARDIOLOGY | Facility: CLINIC | Age: 39
End: 2019-01-24

## 2019-01-24 VITALS
DIASTOLIC BLOOD PRESSURE: 80 MMHG | BODY MASS INDEX: 22.91 KG/M2 | HEIGHT: 67 IN | HEART RATE: 81 BPM | SYSTOLIC BLOOD PRESSURE: 120 MMHG | WEIGHT: 146 LBS

## 2019-01-24 DIAGNOSIS — I10 BENIGN ESSENTIAL HYPERTENSION: ICD-10-CM

## 2019-01-24 DIAGNOSIS — I44.7 LEFT BUNDLE BRANCH BLOCK: ICD-10-CM

## 2019-01-24 DIAGNOSIS — I42.9 CARDIOMYOPATHY, IDIOPATHIC (HCC): Primary | ICD-10-CM

## 2019-01-24 PROCEDURE — 93000 ELECTROCARDIOGRAM COMPLETE: CPT | Performed by: INTERNAL MEDICINE

## 2019-01-24 PROCEDURE — 99213 OFFICE O/P EST LOW 20 MIN: CPT | Performed by: INTERNAL MEDICINE

## 2019-01-24 NOTE — PROGRESS NOTES
Date of Office Visit: 2019  Encounter Provider: Cory Rasihd MD  Place of Service: [facility] CARDIOLOGY  Patient Name: Nick Beatty  :1980      Chief Complaint   Patient presents with   • Cardiomyopathy     History of Present Illness    The patient is a 38-year-old white male with a history of what appears to be a nonischemic cardiomyopathy.  His ejection fraction is around 40%.  He also has a history of a chronic left bundle branch block.  He returns to the office today feeling well.  He has no major complaints or difficulties.  He's changed his position at work and therefore he is no longer experiencing episodes of tachycardia.  Overall last 6 months things up actually improved for him with the change in his environment.    Past Medical History:   Diagnosis Date   • Anxiety    • Arthritis    • Benign essential hypertension    • Cardiomyopathy (CMS/HCC)    • History of bronchitis    • Irritable bowel syndrome    • Left bundle branch block     7-8 years ago   • Nausea and vomiting    • Visual impairment          Past Surgical History:   Procedure Laterality Date   • EYE SURGERY             Current Outpatient Medications:   •  Cholecalciferol (VITAMIN D3 SUPER STRENGTH) 2000 units tablet, Take  by mouth Daily., Disp: , Rfl:   •  gabapentin (NEURONTIN) 400 MG capsule, TAKE 1 CAPSULE BY MOUTH EVERY DAY IN THE EVENING, Disp: 90 capsule, Rfl: 1  •  hydrOXYzine (VISTARIL) 25 MG capsule, TAKE 1 CAPSULE BY MOUTH 3 (THREE) TIMES A DAY AS NEEDED FOR ITCHING., Disp: 42 capsule, Rfl: 0  •  meclizine (ANTIVERT) 12.5 MG tablet, Take 1 tablet by mouth 2 (Two) Times a Day., Disp: 60 tablet, Rfl: 5  •  MULTIPLE VITAMIN PO, Take  by mouth Daily., Disp: , Rfl:   •  ondansetron (ZOFRAN) 8 MG tablet, TAKE 1 TABLET BY MOUTH EVERY 12 (TWELVE) HOURS AS NEEDED FOR NAUSEA OR VOMITING., Disp: 30 tablet, Rfl: 1  •  promethazine (PHENERGAN) 25 MG tablet, Take 12.5 mg by mouth Every 8 (Eight)  "Hours As Needed., Disp: , Rfl:   •  vitamin B-12 (CYANOCOBALAMIN) 100 MCG tablet, Take 100 mcg by mouth Daily., Disp: , Rfl:     Current Facility-Administered Medications:   •  cyanocobalamin injection 1,000 mcg, 1,000 mcg, Intramuscular, Q30 Days, Norah Newman MD      Social History     Socioeconomic History   • Marital status:      Spouse name: Not on file   • Number of children: Not on file   • Years of education: Not on file   • Highest education level: Not on file   Social Needs   • Financial resource strain: Not on file   • Food insecurity - worry: Not on file   • Food insecurity - inability: Not on file   • Transportation needs - medical: Not on file   • Transportation needs - non-medical: Not on file   Occupational History   • Not on file   Tobacco Use   • Smoking status: Former Smoker     Years: 15.00     Types: Electronic Cigarette     Start date: 6/11/1999     Last attempt to quit: 8/8/2015     Years since quitting: 3.4   • Smokeless tobacco: Never Used   Substance and Sexual Activity   • Alcohol use: No     Comment: Daily caffeine use   • Drug use: No   • Sexual activity: No   Other Topics Concern   • Not on file   Social History Narrative   • Not on file         Review of Systems   Constitution: Negative.   HENT: Negative.    Eyes: Negative.    Cardiovascular: Positive for leg swelling.   Respiratory: Negative.    Endocrine: Negative.    Skin: Negative.    Musculoskeletal: Negative.    Gastrointestinal: Negative.    Neurological: Negative.    Psychiatric/Behavioral: Negative.        Procedures      ECG 12 Lead  Date/Time: 1/24/2019 1:10 PM  Performed by: Cory Rashid MD  Authorized by: Cory Rashid MD   Comparison: compared with previous ECG from 6/20/2018  Similar to previous ECG  Rhythm: sinus rhythm  Rate: normal  Conduction: conduction normal  QRS axis: normal                  Objective:    /80   Pulse 81   Ht 170.2 cm (67\")   Wt 66.2 kg (146 lb)   BMI 22.87 " kg/m²         Physical Exam   Constitutional: He is oriented to person, place, and time. He appears well-developed and well-nourished.   HENT:   Head: Normocephalic.   Eyes: Pupils are equal, round, and reactive to light.   Neck: Normal range of motion. No JVD present. Carotid bruit is not present. No thyromegaly present.   Cardiovascular: Normal rate, regular rhythm, S1 normal, S2 normal, normal heart sounds and intact distal pulses. Exam reveals no gallop and no friction rub.   No murmur heard.  Pulmonary/Chest: Effort normal and breath sounds normal.   Abdominal: Soft. Bowel sounds are normal.   Musculoskeletal: He exhibits no edema.   Neurological: He is alert and oriented to person, place, and time.   Skin: Skin is warm, dry and intact. No erythema.   Psychiatric: He has a normal mood and affect.   Vitals reviewed.          Assessment:       Diagnosis Plan   1. Cardiomyopathy, idiopathic (CMS/HCC)     2. Left bundle branch block     3. Benign essential hypertension         1.  Dilated nonischemic cardiomyopathy: Asymptomatic.  Ejection fraction 40%.  No signs of heart failure.  We'll plan on repeating his echocardiogram in one year when he returns    2.  Hypertension: Controlled    3.  Chronic left bundle branch block     Plan:

## 2019-02-28 DIAGNOSIS — R42 DIZZINESS: ICD-10-CM

## 2019-03-01 RX ORDER — MECLIZINE HCL 12.5 MG/1
TABLET ORAL
Qty: 60 TABLET | Refills: 5 | Status: SHIPPED | OUTPATIENT
Start: 2019-03-01

## 2019-03-01 RX ORDER — HYDROXYZINE PAMOATE 25 MG/1
25 CAPSULE ORAL 3 TIMES DAILY PRN
Qty: 42 CAPSULE | Refills: 0 | Status: SHIPPED | OUTPATIENT
Start: 2019-03-01 | End: 2019-04-13 | Stop reason: SDUPTHER

## 2019-04-13 DIAGNOSIS — R11.0 CHRONIC NAUSEA: ICD-10-CM

## 2019-04-15 RX ORDER — ONDANSETRON HYDROCHLORIDE 8 MG/1
8 TABLET, FILM COATED ORAL EVERY 12 HOURS PRN
Qty: 30 TABLET | Refills: 0 | Status: SHIPPED | OUTPATIENT
Start: 2019-04-15 | End: 2020-02-27

## 2019-04-15 RX ORDER — HYDROXYZINE PAMOATE 25 MG/1
25 CAPSULE ORAL 3 TIMES DAILY PRN
Qty: 42 CAPSULE | Refills: 0 | Status: SHIPPED | OUTPATIENT
Start: 2019-04-15 | End: 2020-08-17

## 2019-07-13 DIAGNOSIS — G62.9 NEUROPATHY: ICD-10-CM

## 2019-07-16 RX ORDER — GABAPENTIN 400 MG/1
CAPSULE ORAL
Qty: 90 CAPSULE | Refills: 0 | Status: SHIPPED | OUTPATIENT
Start: 2019-07-16 | End: 2019-10-16 | Stop reason: SDUPTHER

## 2019-08-23 ENCOUNTER — OFFICE VISIT (OUTPATIENT)
Dept: FAMILY MEDICINE CLINIC | Facility: CLINIC | Age: 39
End: 2019-08-23

## 2019-08-23 VITALS
HEIGHT: 67 IN | BODY MASS INDEX: 23.09 KG/M2 | SYSTOLIC BLOOD PRESSURE: 128 MMHG | DIASTOLIC BLOOD PRESSURE: 72 MMHG | RESPIRATION RATE: 18 BRPM | HEART RATE: 81 BPM | OXYGEN SATURATION: 98 % | WEIGHT: 147.1 LBS

## 2019-08-23 DIAGNOSIS — H93.12 TINNITUS OF LEFT EAR: ICD-10-CM

## 2019-08-23 DIAGNOSIS — R42 VERTIGO: Primary | ICD-10-CM

## 2019-08-23 PROCEDURE — 99213 OFFICE O/P EST LOW 20 MIN: CPT | Performed by: FAMILY MEDICINE

## 2019-08-23 NOTE — PROGRESS NOTES
ASSESSMENT AND PLAN  Problem List Items Addressed This Visit     None      Visit Diagnoses     Vertigo    -  Primary    Relevant Orders    Ambulatory Referral to ENT (Otolaryngology)    Tinnitus of left ear        Relevant Orders    Ambulatory Referral to ENT (Otolaryngology)          Return if symptoms worsen or fail to improve.  Patient Instructions   Take meclizine twice daily until he is seen by the ENTs.          SUBJECTIVE  Nick Beatty is a 39 y.o. male being seen in our office today for Dizziness               Social History  He  reports that he quit smoking about 4 years ago. His smoking use included electronic cigarette. He started smoking about 20 years ago. He quit after 15.00 years of use. He has never used smokeless tobacco. He reports that he does not drink alcohol or use drugs.    History of the Present Illness   HPI He is having dizziness, some cramping in the back of his legs. He will stand still and feel like he is moving. He is seeing the eye doctor too. Taking meclizine, zofran and promethazine.   He is under a lot of stress with his home. He sometimes feels like his equilibrium is off. Feels like the room is going around and he gets nauseated then he doesn't eat.  This is different than his previous episodes of dizziness.  The tinnitus is not new however usually he has an ear full of wax that contributes to it and today on exam that is not the case.    Significant Past History  The following portions of the patient's history were reviewed and updated as appropriate:PMHroutine: Social history , Allergies, Current Medications, Active Problem List and Health Maintenance    Review of Systems   Constitutional: Positive for unexpected weight change. Negative for activity change, appetite change, chills, fatigue and fever.   HENT: Negative for congestion, ear pain, hearing loss, mouth sores, nosebleeds, rhinorrhea and sore throat.    Eyes: Negative for pain and visual disturbance.   Respiratory:  Negative for cough, shortness of breath and wheezing.    Cardiovascular: Negative for chest pain, palpitations and leg swelling.   Gastrointestinal: Positive for nausea and vomiting. Negative for abdominal distention, abdominal pain, blood in stool, constipation and diarrhea.   Endocrine: Negative for cold intolerance and heat intolerance.   Genitourinary: Negative for difficulty urinating, discharge, dysuria, frequency, hematuria and urgency.   Musculoskeletal: Negative for back pain and joint swelling.   Skin: Negative for rash and wound.   Neurological: Negative for dizziness, weakness, numbness and headaches.   Hematological: Does not bruise/bleed easily.   Psychiatric/Behavioral: Negative for confusion, dysphoric mood, sleep disturbance and suicidal ideas. The patient is nervous/anxious.        OBJECTIVE   Vital Signs          BP Readings from Last 1 Encounters:   08/23/19 128/72     Wt Readings from Last 3 Encounters:   08/23/19 66.7 kg (147 lb 1.6 oz)   01/24/19 66.2 kg (146 lb)   12/19/18 63 kg (139 lb)   Body mass index is 23.04 kg/m².     Physical Exam   Constitutional: He appears well-developed and well-nourished.   HENT:   Right Ear: External ear normal.   Left Ear: External ear normal.   Bilateral canals are not blocked by wax but I am unable to see past the wax to the eardrums beyond.   Psychiatric: He has a normal mood and affect. His behavior is normal. Judgment and thought content normal.   Vitals reviewed.    Data Reviewed     No results found for this or any previous visit (from the past 2016 hour(s)).

## 2019-10-16 DIAGNOSIS — G62.9 NEUROPATHY: ICD-10-CM

## 2019-10-18 RX ORDER — GABAPENTIN 400 MG/1
CAPSULE ORAL
Qty: 90 CAPSULE | Refills: 0 | Status: SHIPPED | OUTPATIENT
Start: 2019-10-18 | End: 2020-01-20

## 2020-01-18 DIAGNOSIS — G62.9 NEUROPATHY: ICD-10-CM

## 2020-01-20 RX ORDER — GABAPENTIN 400 MG/1
CAPSULE ORAL
Qty: 90 CAPSULE | Refills: 0 | Status: SHIPPED | OUTPATIENT
Start: 2020-01-20 | End: 2020-05-28

## 2020-02-26 DIAGNOSIS — R11.0 CHRONIC NAUSEA: ICD-10-CM

## 2020-02-27 RX ORDER — ONDANSETRON HYDROCHLORIDE 8 MG/1
8 TABLET, FILM COATED ORAL EVERY 12 HOURS PRN
Qty: 30 TABLET | Refills: 0 | Status: SHIPPED | OUTPATIENT
Start: 2020-02-27 | End: 2020-05-28

## 2020-04-15 ENCOUNTER — TELEMEDICINE (OUTPATIENT)
Dept: CARDIOLOGY | Facility: CLINIC | Age: 40
End: 2020-04-15

## 2020-04-15 VITALS — BODY MASS INDEX: 23.07 KG/M2 | WEIGHT: 147 LBS | HEIGHT: 67 IN

## 2020-04-15 DIAGNOSIS — I42.9 CARDIOMYOPATHY, IDIOPATHIC (HCC): Primary | ICD-10-CM

## 2020-04-15 DIAGNOSIS — I44.7 LEFT BUNDLE BRANCH BLOCK: ICD-10-CM

## 2020-04-15 DIAGNOSIS — I10 BENIGN ESSENTIAL HYPERTENSION: ICD-10-CM

## 2020-04-15 PROCEDURE — 99213 OFFICE O/P EST LOW 20 MIN: CPT | Performed by: INTERNAL MEDICINE

## 2020-04-15 NOTE — PROGRESS NOTES
Date of Office Visit: 04/15/2020    Patient Name: Nick Beatty  : 1980    Encounter Provider: Cory Rashid MD  Referring Provider: No ref. provider found  Place of Service: [facility] CARDIOLOGY  Patient Care Team:  Norah Newman MD as PCP - General  Norah Newman MD as PCP - Family Medicine  Cory Rashid MD as Consulting Physician (Cardiology)    The patient consented to a telehealth visit. This was an audio and video enabled telemedicine encounter.    Chief Complaint   Patient presents with   • Cardiomyopathy     c/o few lightheadedness episodes, elev HR     History of Present Illness    The patient is a 39-year-old white male with a history of nonischemic cardiomyopathy as well as chronic left bundle branch block.  The patient's last evaluation of left ventricular function was 2 years ago.  At that time his ejection fraction was 40%.    The patient complains of intermittent rotations and lightheadedness.  This occurs approximately once a week and lasts generally for about 5 minutes maybe more.  There is no precipitating events.  The patient has not lost consciousness.  He does feel his heart racing at times but then it subsides.  This is not terribly new for him.  He has not increased in frequency or duration.    The patient continues to work.  He is wearing a mask during his interactions.    Past Medical History:   Diagnosis Date   • Anxiety    • Arthritis    • Benign essential hypertension    • Cardiomyopathy (CMS/HCC)    • History of bronchitis    • Irritable bowel syndrome    • Left bundle branch block     7-8 years ago   • Nausea and vomiting    • Visual impairment          Past Surgical History:   Procedure Laterality Date   • EYE SURGERY             Current Outpatient Medications:   •  Cholecalciferol (VITAMIN D3 SUPER STRENGTH) 2000 units tablet, Take  by mouth Daily., Disp: , Rfl:   •  gabapentin (NEURONTIN) 400 MG capsule, TAKE 1 CAPSULE  "BY MOUTH EVERY EVENING, Disp: 90 capsule, Rfl: 0  •  hydrOXYzine pamoate (VISTARIL) 25 MG capsule, TAKE 1 CAPSULE BY MOUTH 3 (THREE) TIMES A DAY AS NEEDED FOR ITCHING., Disp: 42 capsule, Rfl: 0  •  meclizine (ANTIVERT) 12.5 MG tablet, TAKE 1 TABLET BY MOUTH TWICE A DAY, Disp: 60 tablet, Rfl: 5  •  MULTIPLE VITAMIN PO, Take  by mouth Daily., Disp: , Rfl:   •  ondansetron (ZOFRAN) 8 MG tablet, TAKE 1 TABLET BY MOUTH EVERY 12 (TWELVE) HOURS AS NEEDED FOR NAUSEA OR VOMITING., Disp: 30 tablet, Rfl: 0  •  promethazine (PHENERGAN) 25 MG tablet, Take 12.5 mg by mouth Every 8 (Eight) Hours As Needed., Disp: , Rfl:   •  vitamin B-12 (CYANOCOBALAMIN) 100 MCG tablet, Take 100 mcg by mouth Daily., Disp: , Rfl:     Current Facility-Administered Medications:   •  cyanocobalamin injection 1,000 mcg, 1,000 mcg, Intramuscular, Q30 Days, Norah Newman MD      Social History     Socioeconomic History   • Marital status:      Spouse name: Not on file   • Number of children: Not on file   • Years of education: Not on file   • Highest education level: Not on file   Tobacco Use   • Smoking status: Former Smoker     Years: 15.00     Types: Electronic Cigarette     Start date: 1999     Last attempt to quit: 2015     Years since quittin.6   • Smokeless tobacco: Never Used   Substance and Sexual Activity   • Alcohol use: No     Comment: Daily caffeine use   • Drug use: No   • Sexual activity: Never         Review of Systems   Constitution: Negative.   HENT: Negative.    Eyes: Negative.    Cardiovascular: Positive for palpitations.   Respiratory: Negative.    Endocrine: Negative.    Skin: Negative.    Musculoskeletal: Negative.    Gastrointestinal: Negative.    Neurological: Positive for light-headedness.   Psychiatric/Behavioral: The patient is nervous/anxious.        Procedures    Procedures        Objective:    Ht 170.2 cm (67\")   Wt 66.7 kg (147 lb)   BMI 23.02 kg/m²         Physical Exam    No physical examination " performed.  Assessment:       Diagnosis Plan   1. Cardiomyopathy, idiopathic (CMS/HCC)     2. Left bundle branch block     3. Benign essential hypertension       1.  Cardiomyopathy: Nonischemic: We will plan on follow-up echocardiogram in May.  2.  Left bundle branch block: Chronic  3.  History of hypertension: No recent blood pressure  4.  Nicotine abuse: The patient has been advised to discontinue all nicotine products.       Plan:   1.  Echocardiogram  2.  Electrocardiogram  3.  Blood pressure check  4.  Discontinue nicotine products.    Total time of patient encounter including direct patient contact: 20 minutes

## 2020-05-18 ENCOUNTER — CLINICAL SUPPORT (OUTPATIENT)
Dept: CARDIOLOGY | Facility: CLINIC | Age: 40
End: 2020-05-18

## 2020-05-18 ENCOUNTER — HOSPITAL ENCOUNTER (OUTPATIENT)
Dept: CARDIOLOGY | Facility: HOSPITAL | Age: 40
Discharge: HOME | End: 2020-05-18
Admitting: INTERNAL MEDICINE

## 2020-05-18 VITALS
HEIGHT: 67 IN | SYSTOLIC BLOOD PRESSURE: 128 MMHG | HEART RATE: 96 BPM | BODY MASS INDEX: 23.07 KG/M2 | DIASTOLIC BLOOD PRESSURE: 72 MMHG | WEIGHT: 147 LBS

## 2020-05-18 DIAGNOSIS — I44.7 LBBB (LEFT BUNDLE BRANCH BLOCK): Primary | ICD-10-CM

## 2020-05-18 DIAGNOSIS — I44.7 LEFT BUNDLE BRANCH BLOCK: Primary | ICD-10-CM

## 2020-05-18 DIAGNOSIS — I42.9 CARDIOMYOPATHY, IDIOPATHIC (HCC): ICD-10-CM

## 2020-05-18 LAB
AORTIC ARCH: 2.2 CM
AORTIC ROOT ANNULUS: 2.3 CM
ASCENDING AORTA: 3.4 CM
BH CV ECHO MEAS - ACS: 2.1 CM
BH CV ECHO MEAS - AO MAX PG (FULL): 4.9 MMHG
BH CV ECHO MEAS - AO MAX PG: 8.5 MMHG
BH CV ECHO MEAS - AO MEAN PG (FULL): 2.8 MMHG
BH CV ECHO MEAS - AO MEAN PG: 4.6 MMHG
BH CV ECHO MEAS - AO V2 MAX: 145.5 CM/SEC
BH CV ECHO MEAS - AO V2 MEAN: 99.2 CM/SEC
BH CV ECHO MEAS - AO V2 VTI: 27.6 CM
BH CV ECHO MEAS - ASC AORTA: 3.4 CM
BH CV ECHO MEAS - AVA(I,A): 2 CM^2
BH CV ECHO MEAS - AVA(I,D): 2 CM^2
BH CV ECHO MEAS - AVA(V,A): 1.9 CM^2
BH CV ECHO MEAS - AVA(V,D): 1.9 CM^2
BH CV ECHO MEAS - BSA(HAYCOCK): 1.8 M^2
BH CV ECHO MEAS - BSA: 1.8 M^2
BH CV ECHO MEAS - BZI_BMI: 23 KILOGRAMS/M^2
BH CV ECHO MEAS - BZI_METRIC_HEIGHT: 170.2 CM
BH CV ECHO MEAS - BZI_METRIC_WEIGHT: 66.7 KG
BH CV ECHO MEAS - EDV(MOD-SP2): 102 ML
BH CV ECHO MEAS - EDV(MOD-SP4): 95 ML
BH CV ECHO MEAS - EDV(TEICH): 118 ML
BH CV ECHO MEAS - EF(CUBED): 64.6 %
BH CV ECHO MEAS - EF(MOD-BP): 45 %
BH CV ECHO MEAS - EF(MOD-SP2): 43.1 %
BH CV ECHO MEAS - EF(MOD-SP4): 46.3 %
BH CV ECHO MEAS - EF(TEICH): 55.9 %
BH CV ECHO MEAS - ESV(MOD-SP2): 58 ML
BH CV ECHO MEAS - ESV(MOD-SP4): 51 ML
BH CV ECHO MEAS - ESV(TEICH): 52 ML
BH CV ECHO MEAS - FS: 29.3 %
BH CV ECHO MEAS - IVS/LVPW: 1.1
BH CV ECHO MEAS - IVSD: 1.2 CM
BH CV ECHO MEAS - LAT PEAK E' VEL: 8 CM/SEC
BH CV ECHO MEAS - LV DIASTOLIC VOL/BSA (35-75): 53.5 ML/M^2
BH CV ECHO MEAS - LV MASS(C)D: 222.2 GRAMS
BH CV ECHO MEAS - LV MASS(C)DI: 125.2 GRAMS/M^2
BH CV ECHO MEAS - LV MAX PG: 3.6 MMHG
BH CV ECHO MEAS - LV MEAN PG: 1.8 MMHG
BH CV ECHO MEAS - LV SYSTOLIC VOL/BSA (12-30): 28.7 ML/M^2
BH CV ECHO MEAS - LV V1 MAX: 94.6 CM/SEC
BH CV ECHO MEAS - LV V1 MEAN: 60.4 CM/SEC
BH CV ECHO MEAS - LV V1 VTI: 18.7 CM
BH CV ECHO MEAS - LVIDD: 5 CM
BH CV ECHO MEAS - LVIDS: 3.5 CM
BH CV ECHO MEAS - LVLD AP2: 8.9 CM
BH CV ECHO MEAS - LVLD AP4: 9.1 CM
BH CV ECHO MEAS - LVLS AP2: 8.1 CM
BH CV ECHO MEAS - LVLS AP4: 7.9 CM
BH CV ECHO MEAS - LVOT AREA (M): 2.8 CM^2
BH CV ECHO MEAS - LVOT AREA: 3 CM^2
BH CV ECHO MEAS - LVOT DIAM: 1.9 CM
BH CV ECHO MEAS - LVPWD: 1.1 CM
BH CV ECHO MEAS - MED PEAK E' VEL: 6 CM/SEC
BH CV ECHO MEAS - MV A MAX VEL: 95.7 CM/SEC
BH CV ECHO MEAS - MV E MAX VEL: 63.4 CM/SEC
BH CV ECHO MEAS - MV E/A: 0.66
BH CV ECHO MEAS - MV MAX PG: 7 MMHG
BH CV ECHO MEAS - MV MEAN PG: 2.9 MMHG
BH CV ECHO MEAS - MV V2 MAX: 132.5 CM/SEC
BH CV ECHO MEAS - MV V2 MEAN: 76.4 CM/SEC
BH CV ECHO MEAS - MV V2 VTI: 16.1 CM
BH CV ECHO MEAS - MVA(VTI): 3.4 CM^2
BH CV ECHO MEAS - PA MAX PG (FULL): 1.8 MMHG
BH CV ECHO MEAS - PA MAX PG: 4.1 MMHG
BH CV ECHO MEAS - PA V2 MAX: 101.8 CM/SEC
BH CV ECHO MEAS - PULM A REVS DUR: 0.1 SEC
BH CV ECHO MEAS - PULM A REVS VEL: 38.6 CM/SEC
BH CV ECHO MEAS - PULM DIAS VEL: 48 CM/SEC
BH CV ECHO MEAS - PULM S/D: 1.2
BH CV ECHO MEAS - PULM SYS VEL: 57.4 CM/SEC
BH CV ECHO MEAS - PVA(V,A): 2.3 CM^2
BH CV ECHO MEAS - PVA(V,D): 2.3 CM^2
BH CV ECHO MEAS - QP/QS: 0.93
BH CV ECHO MEAS - RV BASE (<4.1) - OBSOLETE: 2.8 CM
BH CV ECHO MEAS - RV LENGTH (<8.5) - OBSOLETE: 7.6 CM
BH CV ECHO MEAS - RV MAX PG: 2.4 MMHG
BH CV ECHO MEAS - RV MEAN PG: 1.2 MMHG
BH CV ECHO MEAS - RV V1 MAX: 76.7 CM/SEC
BH CV ECHO MEAS - RV V1 MEAN: 50.2 CM/SEC
BH CV ECHO MEAS - RV V1 VTI: 17 CM
BH CV ECHO MEAS - RVOT AREA: 3 CM^2
BH CV ECHO MEAS - RVOT DIAM: 2 CM
BH CV ECHO MEAS - SI(CUBED): 45.4 ML/M^2
BH CV ECHO MEAS - SI(LVOT): 31.2 ML/M^2
BH CV ECHO MEAS - SI(MOD-SP2): 24.8 ML/M^2
BH CV ECHO MEAS - SI(MOD-SP4): 24.8 ML/M^2
BH CV ECHO MEAS - SI(TEICH): 37.2 ML/M^2
BH CV ECHO MEAS - SUP REN AO DIAM: 1.7 CM
BH CV ECHO MEAS - SV(CUBED): 80.6 ML
BH CV ECHO MEAS - SV(LVOT): 55.3 ML
BH CV ECHO MEAS - SV(MOD-SP2): 44 ML
BH CV ECHO MEAS - SV(MOD-SP4): 44 ML
BH CV ECHO MEAS - SV(RVOT): 51.7 ML
BH CV ECHO MEAS - SV(TEICH): 66 ML
BH CV ECHO MEAS - TAPSE (>1.6): 2.6 CM2
BH CV ECHO MEASUREMENTS AVERAGE E/E' RATIO: 9.06
BH CV XLRA - RV BASE: 2.8 CM
BH CV XLRA - RV LENGTH: 7.6 CM
BH CV XLRA - RV MID: 1.9 CM
BH CV XLRA - TDI S': 11 CM/SEC
LEFT ATRIUM VOLUME INDEX: 21 ML/M2
MAXIMAL PREDICTED HEART RATE: 182 BPM
SINUS: 3.3 CM
STJ: 2.9 CM
STRESS TARGET HR: 155 BPM

## 2020-05-18 PROCEDURE — 93356 MYOCRD STRAIN IMG SPCKL TRCK: CPT | Performed by: INTERNAL MEDICINE

## 2020-05-18 PROCEDURE — 93356 MYOCRD STRAIN IMG SPCKL TRCK: CPT

## 2020-05-18 PROCEDURE — 93000 ELECTROCARDIOGRAM COMPLETE: CPT | Performed by: INTERNAL MEDICINE

## 2020-05-18 PROCEDURE — 93306 TTE W/DOPPLER COMPLETE: CPT

## 2020-05-18 PROCEDURE — 93306 TTE W/DOPPLER COMPLETE: CPT | Performed by: INTERNAL MEDICINE

## 2020-05-18 NOTE — PROGRESS NOTES
Procedure     ECG 12 Lead  Date/Time: 5/18/2020 3:13 PM  Performed by: Cory Rashid MD  Authorized by: Cory Rashid MD   Comparison: compared with previous ECG from 1/24/2019  Similar to previous ECG  Rhythm: sinus rhythm  Rate: normal  Conduction: left bundle branch block  QRS axis: normal             Patient presents today for EKG only.  He is not having any symptoms.  He states it is routine due to his LBBB.  Dr. Rashid reviewed EKG and instructed patient could get his echo done and no need to keep patient after.  / AMADO

## 2020-05-27 DIAGNOSIS — R11.0 CHRONIC NAUSEA: ICD-10-CM

## 2020-05-27 DIAGNOSIS — G62.9 NEUROPATHY: ICD-10-CM

## 2020-05-28 RX ORDER — GABAPENTIN 400 MG/1
CAPSULE ORAL
Qty: 90 CAPSULE | Refills: 0 | Status: SHIPPED | OUTPATIENT
Start: 2020-05-28 | End: 2020-09-08

## 2020-05-28 RX ORDER — ONDANSETRON HYDROCHLORIDE 8 MG/1
TABLET, FILM COATED ORAL
Qty: 30 TABLET | Refills: 0 | Status: SHIPPED | OUTPATIENT
Start: 2020-05-28 | End: 2020-08-17

## 2020-08-17 DIAGNOSIS — R11.0 CHRONIC NAUSEA: ICD-10-CM

## 2020-08-17 RX ORDER — HYDROXYZINE PAMOATE 25 MG/1
25 CAPSULE ORAL 3 TIMES DAILY PRN
Qty: 42 CAPSULE | Refills: 0 | Status: SHIPPED | OUTPATIENT
Start: 2020-08-17 | End: 2020-09-11

## 2020-08-17 RX ORDER — ONDANSETRON HYDROCHLORIDE 8 MG/1
TABLET, FILM COATED ORAL
Qty: 30 TABLET | Refills: 0 | Status: SHIPPED | OUTPATIENT
Start: 2020-08-17 | End: 2021-03-01

## 2020-09-01 ENCOUNTER — TELEPHONE (OUTPATIENT)
Dept: FAMILY MEDICINE CLINIC | Facility: CLINIC | Age: 40
End: 2020-09-01

## 2020-09-01 NOTE — TELEPHONE ENCOUNTER
Informed pt that his [facility] paperwork has been faxed and is ready to picked up.

## 2020-09-07 DIAGNOSIS — G62.9 NEUROPATHY: ICD-10-CM

## 2020-09-08 RX ORDER — GABAPENTIN 400 MG/1
CAPSULE ORAL
Qty: 90 CAPSULE | Refills: 0 | Status: SHIPPED | OUTPATIENT
Start: 2020-09-08 | End: 2020-12-22 | Stop reason: SDUPTHER

## 2020-09-08 NOTE — TELEPHONE ENCOUNTER
Since it's been more than a year it needs to be in office. If he hasn't had any recent labs he needs those too.

## 2020-09-08 NOTE — TELEPHONE ENCOUNTER
Please call and schedule for labs and an appt to see dr cheung in the office.    Needs to be done in the next 3 weeks.

## 2020-09-11 RX ORDER — HYDROXYZINE PAMOATE 25 MG/1
25 CAPSULE ORAL 3 TIMES DAILY PRN
Qty: 100 CAPSULE | Refills: 0 | Status: SHIPPED | OUTPATIENT
Start: 2020-09-11 | End: 2020-10-19

## 2020-10-02 ENCOUNTER — TELEMEDICINE (OUTPATIENT)
Dept: FAMILY MEDICINE CLINIC | Facility: CLINIC | Age: 40
End: 2020-10-02

## 2020-10-02 VITALS — WEIGHT: 145 LBS | HEIGHT: 67 IN | BODY MASS INDEX: 22.76 KG/M2

## 2020-10-02 DIAGNOSIS — I10 BENIGN ESSENTIAL HYPERTENSION: Primary | ICD-10-CM

## 2020-10-02 DIAGNOSIS — E55.9 VITAMIN D DEFICIENCY: ICD-10-CM

## 2020-10-02 DIAGNOSIS — R42 DIZZINESS: ICD-10-CM

## 2020-10-02 DIAGNOSIS — E53.8 B12 DEFICIENCY: ICD-10-CM

## 2020-10-02 DIAGNOSIS — Z00.00 LABORATORY EXAMINATION ORDERED AS PART OF A ROUTINE GENERAL MEDICAL EXAMINATION: ICD-10-CM

## 2020-10-02 PROCEDURE — 99214 OFFICE O/P EST MOD 30 MIN: CPT | Performed by: FAMILY MEDICINE

## 2020-10-02 NOTE — PROGRESS NOTES
Assessment and Plan:     Problem List Items Addressed This Visit        Cardiovascular and Mediastinum    Benign essential hypertension - Primary    Overview     KANieder 10/2/2020  BP check with blood next Friday. OK with Dr. Rashid in May/April            Digestive    B12 deficiency    Overview     KANieder 10/2/2020  Labs next Friday         Relevant Orders    Vitamin B12    Vitamin D deficiency    Overview     KANieder 10/2/2020  Labs next Friday         Relevant Orders    Vitamin D 25 hydroxy      Other Visit Diagnoses     Dizziness        Relevant Orders    TSH    Laboratory examination ordered as part of a routine general medical examination        Relevant Orders    TSH    CBC & Differential    Comprehensive Metabolic Panel    Lipid Panel With LDL / HDL Ratio    Vitamin D 25 hydroxy        Come in next Friday for labs and BP reading.  Patient was given instructions and counseling regarding his condition or for health maintenance advice. Please see specific information pulled into the AVS if appropriate.        SUBJECTIVE   Nick Beatty is a 40 y.o. male being seen today for Hypertension               Social History  He  reports that he quit smoking about 5 years ago. His smoking use included electronic cigarette. He started smoking about 21 years ago. He quit after 15.00 years of use. He has never used smokeless tobacco. He reports that he does not drink alcohol or use drugs.    History of the Present Illness   HPI He wakes up in a fog, if he stands up too quickly he will be dizzy. If he goes back to sleep it helps. Occurs maybe once a week or every other week. If he over-exerts himself he is more likely to have an episode   Significant Past History  The following portions of the patient's history were reviewed and updated as appropriate:PMHroutine: Social history , Allergies, Current Medications, Active Problem List and Health Maintenance    Review of Systems   Constitutional: Negative for activity change,  fatigue and fever.   Respiratory: Negative for cough and shortness of breath.    Cardiovascular: Negative for chest pain.   Psychiatric/Behavioral: Negative for dysphoric mood and sleep disturbance. The patient is not nervous/anxious.    I have reviewed the ROS as documented by the MA. Norah Newman MD      OBJECTIVE  Vital Signs          BP Readings from Last 1 Encounters:   05/18/20 128/72     Wt Readings from Last 3 Encounters:   10/02/20 65.8 kg (145 lb)   05/18/20 66.7 kg (147 lb)   04/15/20 66.7 kg (147 lb)   Body mass index is 22.71 kg/m².     Physical Exam  Vitals signs reviewed.   Constitutional:       Appearance: Normal appearance. He is well-developed.   Neurological:      Mental Status: He is alert.   Psychiatric:         Behavior: Behavior normal.         Thought Content: Thought content normal.         Judgment: Judgment normal.         {SnapShot  Notes  Encounters  Result Review  Labs  Imaging  [employer]  Media :23}

## 2020-10-09 ENCOUNTER — CLINICAL SUPPORT (OUTPATIENT)
Dept: FAMILY MEDICINE CLINIC | Facility: CLINIC | Age: 40
End: 2020-10-09

## 2020-10-09 ENCOUNTER — RESULTS ENCOUNTER (OUTPATIENT)
Dept: FAMILY MEDICINE CLINIC | Facility: CLINIC | Age: 40
End: 2020-10-09

## 2020-10-09 VITALS — SYSTOLIC BLOOD PRESSURE: 148 MMHG | DIASTOLIC BLOOD PRESSURE: 88 MMHG

## 2020-10-09 DIAGNOSIS — Z00.00 LABORATORY EXAMINATION ORDERED AS PART OF A ROUTINE GENERAL MEDICAL EXAMINATION: ICD-10-CM

## 2020-10-09 DIAGNOSIS — E55.9 VITAMIN D DEFICIENCY: ICD-10-CM

## 2020-10-09 DIAGNOSIS — E53.8 B12 DEFICIENCY: ICD-10-CM

## 2020-10-09 DIAGNOSIS — R42 DIZZINESS: ICD-10-CM

## 2020-10-10 LAB
25(OH)D3+25(OH)D2 SERPL-MCNC: 67.3 NG/ML (ref 30–100)
ALBUMIN SERPL-MCNC: 4.4 G/DL (ref 4–5)
ALBUMIN/GLOB SERPL: 2 {RATIO} (ref 1.2–2.2)
ALP SERPL-CCNC: 75 IU/L (ref 39–117)
ALT SERPL-CCNC: 26 IU/L (ref 0–44)
AST SERPL-CCNC: 21 IU/L (ref 0–40)
BASOPHILS # BLD AUTO: 0 X10E3/UL (ref 0–0.2)
BASOPHILS NFR BLD AUTO: 0 %
BILIRUB SERPL-MCNC: 0.7 MG/DL (ref 0–1.2)
BUN SERPL-MCNC: 10 MG/DL (ref 6–20)
BUN/CREAT SERPL: 18 (ref 9–20)
CALCIUM SERPL-MCNC: 9 MG/DL (ref 8.7–10.2)
CHLORIDE SERPL-SCNC: 102 MMOL/L (ref 96–106)
CHOLEST SERPL-MCNC: 196 MG/DL (ref 100–199)
CO2 SERPL-SCNC: 24 MMOL/L (ref 20–29)
CREAT SERPL-MCNC: 0.56 MG/DL (ref 0.76–1.27)
EOSINOPHIL # BLD AUTO: 0.1 X10E3/UL (ref 0–0.4)
EOSINOPHIL NFR BLD AUTO: 3 %
ERYTHROCYTE [DISTWIDTH] IN BLOOD BY AUTOMATED COUNT: 12.5 % (ref 11.6–15.4)
GLOBULIN SER CALC-MCNC: 2.2 G/DL (ref 1.5–4.5)
GLUCOSE SERPL-MCNC: 83 MG/DL (ref 65–99)
HCT VFR BLD AUTO: 39.9 % (ref 37.5–51)
HDLC SERPL-MCNC: 49 MG/DL
HGB BLD-MCNC: 14.1 G/DL (ref 13–17.7)
IMM GRANULOCYTES # BLD AUTO: 0 X10E3/UL (ref 0–0.1)
IMM GRANULOCYTES NFR BLD AUTO: 0 %
LDLC SERPL CALC-MCNC: 134 MG/DL (ref 0–99)
LDLC/HDLC SERPL: 2.7 RATIO (ref 0–3.6)
LYMPHOCYTES # BLD AUTO: 1.4 X10E3/UL (ref 0.7–3.1)
LYMPHOCYTES NFR BLD AUTO: 29 %
MCH RBC QN AUTO: 31.1 PG (ref 26.6–33)
MCHC RBC AUTO-ENTMCNC: 35.3 G/DL (ref 31.5–35.7)
MCV RBC AUTO: 88 FL (ref 79–97)
MONOCYTES # BLD AUTO: 0.5 X10E3/UL (ref 0.1–0.9)
MONOCYTES NFR BLD AUTO: 10 %
NEUTROPHILS # BLD AUTO: 2.7 X10E3/UL (ref 1.4–7)
NEUTROPHILS NFR BLD AUTO: 58 %
PLATELET # BLD AUTO: 253 X10E3/UL (ref 150–450)
POTASSIUM SERPL-SCNC: 4.4 MMOL/L (ref 3.5–5.2)
PROT SERPL-MCNC: 6.6 G/DL (ref 6–8.5)
RBC # BLD AUTO: 4.53 X10E6/UL (ref 4.14–5.8)
SODIUM SERPL-SCNC: 139 MMOL/L (ref 134–144)
TRIGL SERPL-MCNC: 72 MG/DL (ref 0–149)
TSH SERPL DL<=0.005 MIU/L-ACNC: 1.25 UIU/ML (ref 0.45–4.5)
VIT B12 SERPL-MCNC: 508 PG/ML (ref 232–1245)
VLDLC SERPL CALC-MCNC: 13 MG/DL (ref 5–40)
WBC # BLD AUTO: 4.7 X10E3/UL (ref 3.4–10.8)

## 2020-10-12 NOTE — PROGRESS NOTES
Please call patient and let them know that their results are normal, with the exception of his LDL, which was mildly elevated. I would recommend that he decrease his saturated fat and increase his exercise. If he has any questions, please let me know. Thanks.

## 2020-10-19 RX ORDER — HYDROXYZINE PAMOATE 25 MG/1
25 CAPSULE ORAL 3 TIMES DAILY PRN
Qty: 100 CAPSULE | Refills: 0 | Status: SHIPPED | OUTPATIENT
Start: 2020-10-19 | End: 2022-02-03 | Stop reason: SDUPTHER

## 2020-12-22 ENCOUNTER — TELEPHONE (OUTPATIENT)
Dept: FAMILY MEDICINE CLINIC | Facility: CLINIC | Age: 40
End: 2020-12-22

## 2020-12-22 DIAGNOSIS — G62.9 NEUROPATHY: ICD-10-CM

## 2020-12-22 NOTE — TELEPHONE ENCOUNTER
Patient was just prescribed with cataract in his left eye and wants to know if gabapentin (NEURONTIN) 400 MG capsule will have any effects on that.     If not, he is needing a refill.     [facility]/pharmacy #[id number] - [place], [place] - [address]  - [phone]  - [phone] FX

## 2020-12-23 RX ORDER — GABAPENTIN 400 MG/1
400 CAPSULE ORAL EVERY EVENING
Qty: 90 CAPSULE | Refills: 1 | Status: SHIPPED | OUTPATIENT
Start: 2020-12-23 | End: 2021-07-01

## 2021-03-01 DIAGNOSIS — R11.0 CHRONIC NAUSEA: ICD-10-CM

## 2021-03-01 RX ORDER — ONDANSETRON HYDROCHLORIDE 8 MG/1
TABLET, FILM COATED ORAL
Qty: 20 TABLET | Refills: 1 | Status: SHIPPED | OUTPATIENT
Start: 2021-03-01 | End: 2021-11-29

## 2021-04-16 ENCOUNTER — BULK ORDERING (OUTPATIENT)
Dept: CASE MANAGEMENT | Facility: OTHER | Age: 41
End: 2021-04-16

## 2021-04-16 DIAGNOSIS — Z23 IMMUNIZATION DUE: ICD-10-CM

## 2021-07-01 ENCOUNTER — OFFICE VISIT (OUTPATIENT)
Dept: CARDIOLOGY | Facility: CLINIC | Age: 41
End: 2021-07-01

## 2021-07-01 VITALS
BODY MASS INDEX: 25.27 KG/M2 | SYSTOLIC BLOOD PRESSURE: 132 MMHG | DIASTOLIC BLOOD PRESSURE: 100 MMHG | HEART RATE: 84 BPM | WEIGHT: 161 LBS | OXYGEN SATURATION: 98 % | HEIGHT: 67 IN

## 2021-07-01 DIAGNOSIS — I44.7 LEFT BUNDLE BRANCH BLOCK: ICD-10-CM

## 2021-07-01 DIAGNOSIS — I42.9 CARDIOMYOPATHY, IDIOPATHIC (HCC): Primary | ICD-10-CM

## 2021-07-01 DIAGNOSIS — G62.9 NEUROPATHY: ICD-10-CM

## 2021-07-01 DIAGNOSIS — I10 BENIGN ESSENTIAL HYPERTENSION: ICD-10-CM

## 2021-07-01 PROCEDURE — 93000 ELECTROCARDIOGRAM COMPLETE: CPT | Performed by: INTERNAL MEDICINE

## 2021-07-01 PROCEDURE — 99213 OFFICE O/P EST LOW 20 MIN: CPT | Performed by: INTERNAL MEDICINE

## 2021-07-01 RX ORDER — LISINOPRIL 2.5 MG/1
2.5 TABLET ORAL DAILY
Qty: 30 TABLET | Refills: 11 | Status: SHIPPED | OUTPATIENT
Start: 2021-07-01 | End: 2022-06-20

## 2021-07-01 RX ORDER — GABAPENTIN 400 MG/1
CAPSULE ORAL
Qty: 90 CAPSULE | Refills: 0 | Status: SHIPPED | OUTPATIENT
Start: 2021-07-01 | End: 2021-11-17

## 2021-07-01 NOTE — PROGRESS NOTES
Date of Office Visit: 2021    Patient Name: Nick Beatty  : 1980    Encounter Provider: Cory Rashid MD  Referring Provider: No ref. provider found  Place of Service: [facility] CARDIOLOGY  Patient Care Team:  Norah Newman MD as PCP - General  Norah Newman MD as PCP - Family Medicine  Cory Rashid MD as Consulting Physician (Cardiology)      Chief Complaint   Patient presents with   • Cardiomyopathy, idiopathic   • Follow-up     History of Present Illness    Patient is a 41-year-old white male with a nonischemic cardiomyopathy. His ejection fraction remains around 35 to 40%. In the past we have tried him on beta-blocker but he did not tolerate it because of hypotension. He returns today feeling well without any complaints other than some shortness of breath with exertion and fatigue. His blood pressure is quite elevated today at 140/100 in the right arm seated position. He does not check his blood pressure regularly however. He denies any lightheadedness nor dizziness. He denies any peripheral edema or chest discomfort.    Past Medical History:   Diagnosis Date   • Anxiety    • Arthritis    • Benign essential hypertension    • Cardiomyopathy (CMS/HCC)    • History of bronchitis    • Irritable bowel syndrome    • Left bundle branch block     7-8 years ago   • Nausea and vomiting    • Visual impairment          Past Surgical History:   Procedure Laterality Date   • EYE SURGERY             Current Outpatient Medications:   •  Cholecalciferol (VITAMIN D3 SUPER STRENGTH) 2000 units tablet, Take  by mouth Daily., Disp: , Rfl:   •  gabapentin (NEURONTIN) 400 MG capsule, Take 1 capsule by mouth Every Evening., Disp: 90 capsule, Rfl: 1  •  hydrOXYzine pamoate (VISTARIL) 25 MG capsule, TAKE 1 CAPSULE BY MOUTH 3 (THREE) TIMES A DAY AS NEEDED FOR ITCHING, Disp: 100 capsule, Rfl: 0  •  meclizine (ANTIVERT) 12.5 MG tablet, TAKE 1 TABLET BY MOUTH TWICE A  DAY, Disp: 60 tablet, Rfl: 5  •  MULTIPLE VITAMIN PO, Take  by mouth Daily., Disp: , Rfl:   •  ondansetron (ZOFRAN) 8 MG tablet, TAKE 1 TABLET BY MOUTH EVERY TWELVE HOURS AS NEEDED FOR NAUSEA OR VOMITING., Disp: 20 tablet, Rfl: 1  •  promethazine (PHENERGAN) 25 MG tablet, Take 12.5 mg by mouth Every 8 (Eight) Hours As Needed., Disp: , Rfl:   •  vitamin B-12 (CYANOCOBALAMIN) 100 MCG tablet, Take 100 mcg by mouth Daily., Disp: , Rfl:   •  lisinopril (PRINIVIL,ZESTRIL) 2.5 MG tablet, Take 1 tablet by mouth Daily., Disp: 30 tablet, Rfl: 11    Current Facility-Administered Medications:   •  cyanocobalamin injection 1,000 mcg, 1,000 mcg, Intramuscular, Q30 Days, Norah Newman MD      Social History     Socioeconomic History   • Marital status:      Spouse name: Not on file   • Number of children: Not on file   • Years of education: Not on file   • Highest education level: Not on file   Tobacco Use   • Smoking status: Former Smoker     Years: 15.00     Types: Electronic Cigarette     Start date: 1999     Quit date: 2015     Years since quittin.9   • Smokeless tobacco: Never Used   Substance and Sexual Activity   • Alcohol use: No     Comment: Daily caffeine use   • Drug use: No   • Sexual activity: Never         Review of Systems   Constitutional: Positive for malaise/fatigue.   HENT: Negative.    Eyes: Negative.    Cardiovascular: Positive for dyspnea on exertion.   Respiratory: Negative.    Endocrine: Negative.    Skin: Negative.    Musculoskeletal: Negative.    Gastrointestinal: Negative.    Neurological: Negative.    Psychiatric/Behavioral: Negative.        Procedures      ECG 12 Lead    Date/Time: 2021 2:27 PM  Performed by: Cory Rashid MD  Authorized by: Cory Rashid MD   Comparison: compared with previous ECG from 2020  Comparison to previous ECG: Rate is slower  Rhythm: sinus rhythm  Rate: normal  Conduction: left bundle branch block  QRS axis: normal                 "  Objective:    /100 (BP Location: Left arm, Patient Position: Sitting)   Pulse 84   Ht 170.2 cm (67\")   Wt 73 kg (161 lb)   SpO2 98%   BMI 25.22 kg/m²         Vitals reviewed.   Constitutional:       Appearance: Well-developed.   Eyes:      Pupils: Pupils are equal, round, and reactive to light.   HENT:      Head: Normocephalic.   Neck:      Thyroid: No thyromegaly.      Vascular: No carotid bruit or JVD.   Pulmonary:      Effort: Pulmonary effort is normal.      Breath sounds: Normal breath sounds.   Cardiovascular:      Normal rate. Regular rhythm. Normal S1. Normal S2.      Murmurs: There is no murmur.      No gallop.   Pulses:     Intact distal pulses.   Edema:     Peripheral edema absent.   Abdominal:      General: Bowel sounds are normal.      Palpations: Abdomen is soft.   Musculoskeletal:      Cervical back: Normal range of motion. Skin:     General: Skin is warm and dry.      Findings: No erythema.   Neurological:      Mental Status: Alert and oriented to person, place, and time.             Assessment:       Diagnosis Plan   1. Cardiomyopathy, idiopathic (CMS/HCC)     2. Benign essential hypertension     3. Left bundle branch block       1. Idiopathic cardiomyopathy: No signs of heart failure.  2. Hypertension: Inadequately controlled. The patient agrees at this time to start some medication. I am going to place him on low-dose lisinopril at 2.5 mg nightly. I am going to have him come back and check with ELIF Mackey in 1 month.  3. Chronic left bundle branch block       Plan:         "

## 2021-08-05 ENCOUNTER — OFFICE VISIT (OUTPATIENT)
Dept: CARDIOLOGY | Facility: CLINIC | Age: 41
End: 2021-08-05

## 2021-08-05 VITALS
WEIGHT: 160 LBS | DIASTOLIC BLOOD PRESSURE: 104 MMHG | SYSTOLIC BLOOD PRESSURE: 129 MMHG | BODY MASS INDEX: 25.11 KG/M2 | HEIGHT: 67 IN

## 2021-08-05 DIAGNOSIS — E78.5 HYPERLIPIDEMIA, UNSPECIFIED HYPERLIPIDEMIA TYPE: ICD-10-CM

## 2021-08-05 DIAGNOSIS — I10 BENIGN ESSENTIAL HYPERTENSION: ICD-10-CM

## 2021-08-05 DIAGNOSIS — I44.7 LEFT BUNDLE BRANCH BLOCK: ICD-10-CM

## 2021-08-05 DIAGNOSIS — I42.9 CARDIOMYOPATHY, IDIOPATHIC (HCC): Primary | ICD-10-CM

## 2021-08-05 PROCEDURE — 99213 OFFICE O/P EST LOW 20 MIN: CPT | Performed by: NURSE PRACTITIONER

## 2021-08-05 PROCEDURE — 93000 ELECTROCARDIOGRAM COMPLETE: CPT | Performed by: NURSE PRACTITIONER
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
## 2021-11-16 DIAGNOSIS — G62.9 NEUROPATHY: ICD-10-CM

## 2021-11-17 RX ORDER — GABAPENTIN 400 MG/1
CAPSULE ORAL
Qty: 90 CAPSULE | Refills: 0 | Status: SHIPPED | OUTPATIENT
Start: 2021-11-17 | End: 2022-03-01

## 2021-11-27 DIAGNOSIS — R11.0 CHRONIC NAUSEA: ICD-10-CM

## 2021-11-29 RX ORDER — ONDANSETRON HYDROCHLORIDE 8 MG/1
TABLET, FILM COATED ORAL
Qty: 20 TABLET | Refills: 0 | Status: SHIPPED | OUTPATIENT
Start: 2021-11-29 | End: 2022-02-03

## 2022-02-02 DIAGNOSIS — R11.0 CHRONIC NAUSEA: ICD-10-CM

## 2022-02-03 RX ORDER — ONDANSETRON HYDROCHLORIDE 8 MG/1
TABLET, FILM COATED ORAL
Qty: 20 TABLET | Refills: 0 | Status: SHIPPED | OUTPATIENT
Start: 2022-02-03 | End: 2022-10-12 | Stop reason: SDUPTHER

## 2022-02-03 RX ORDER — HYDROXYZINE PAMOATE 25 MG/1
25 CAPSULE ORAL 3 TIMES DAILY PRN
Qty: 100 CAPSULE | Refills: 0 | Status: SHIPPED | OUTPATIENT
Start: 2022-02-03 | End: 2022-03-01

## 2022-02-28 DIAGNOSIS — G62.9 NEUROPATHY: ICD-10-CM

## 2022-03-01 RX ORDER — GABAPENTIN 400 MG/1
CAPSULE ORAL
Qty: 90 CAPSULE | Refills: 0 | Status: SHIPPED | OUTPATIENT
Start: 2022-03-01 | End: 2022-08-04

## 2022-03-01 RX ORDER — HYDROXYZINE PAMOATE 25 MG/1
25 CAPSULE ORAL 3 TIMES DAILY PRN
Qty: 90 CAPSULE | Refills: 1 | Status: SHIPPED | OUTPATIENT
Start: 2022-03-01 | End: 2022-05-16

## 2022-04-04 ENCOUNTER — OFFICE VISIT (OUTPATIENT)
Dept: CARDIOLOGY | Facility: CLINIC | Age: 42
End: 2022-04-04

## 2022-04-04 VITALS
OXYGEN SATURATION: 98 % | DIASTOLIC BLOOD PRESSURE: 90 MMHG | SYSTOLIC BLOOD PRESSURE: 124 MMHG | HEART RATE: 94 BPM | HEIGHT: 67 IN | BODY MASS INDEX: 25.15 KG/M2 | WEIGHT: 160.2 LBS

## 2022-04-04 DIAGNOSIS — I10 BENIGN ESSENTIAL HYPERTENSION: ICD-10-CM

## 2022-04-04 DIAGNOSIS — I44.7 LEFT BUNDLE BRANCH BLOCK: ICD-10-CM

## 2022-04-04 DIAGNOSIS — I42.9 CARDIOMYOPATHY, IDIOPATHIC: Primary | ICD-10-CM

## 2022-04-04 PROCEDURE — 99214 OFFICE O/P EST MOD 30 MIN: CPT | Performed by: INTERNAL MEDICINE

## 2022-04-04 PROCEDURE — 93000 ELECTROCARDIOGRAM COMPLETE: CPT | Performed by: INTERNAL MEDICINE

## 2022-04-04 NOTE — PROGRESS NOTES
OFFICE VISIT      Date of Office Visit: 2022    Patient Name: Nick Beatty  : 1980    Encounter Provider: Cory Rashid MD  Referring Provider: Cory Rashid MD  Primary Care Provider: Norah Newman MD  Place of Service: [facility] CARDIOLOGY        Chief Complaint   Patient presents with   • Cardiomyopathy, idiopathic   • Follow-up     History of Present Illness    Is a 41-year-old white male with a dilated nonischemic cardiomyopathy.  He returns today for follow-up.  His last echocardiogram was in  demonstrating his ejection fraction to be around 40%.  He remains on low-dose lisinopril.  He had previously been on beta-blocker but could not tolerate the drug because of bradycardia.  He reports that he is feeling well.  He denies any shortness of breath or chest discomfort he does occasionally have some fatigue.  He denies any peripheral edema.    Past Medical History:   Diagnosis Date   • Anxiety    • Arthritis    • Benign essential hypertension    • Cardiomyopathy (HCC)    • History of bronchitis    • Irritable bowel syndrome    • Left bundle branch block     7-8 years ago   • Nausea and vomiting    • Visual impairment          Past Surgical History:   Procedure Laterality Date   • EYE SURGERY             Current Outpatient Medications:   •  Cholecalciferol 50 MCG (2000 UT) tablet, Take  by mouth Daily., Disp: , Rfl:   •  gabapentin (NEURONTIN) 400 MG capsule, TAKE 1 CAPSULE BY MOUTH EVERY DAY IN THE EVENING, Disp: 90 capsule, Rfl: 0  •  hydrOXYzine pamoate (VISTARIL) 25 MG capsule, TAKE 1 CAPSULE BY MOUTH 3 (THREE) TIMES A DAY AS NEEDED FOR ITCHING., Disp: 90 capsule, Rfl: 1  •  lisinopril (PRINIVIL,ZESTRIL) 2.5 MG tablet, Take 1 tablet by mouth Daily., Disp: 30 tablet, Rfl: 11  •  meclizine (ANTIVERT) 12.5 MG tablet, TAKE 1 TABLET BY MOUTH TWICE A DAY, Disp: 60 tablet, Rfl: 5  •  MULTIPLE VITAMIN PO, Take  by mouth Daily., Disp: , Rfl:   •   "ondansetron (ZOFRAN) 8 MG tablet, TAKE 1 TABLET BY MOUTH EVERY TWELVE HOURS AS NEEDED FOR NAUSEA OR VOMITING., Disp: 20 tablet, Rfl: 0  •  promethazine (PHENERGAN) 25 MG tablet, Take 12.5 mg by mouth Every 8 (Eight) Hours As Needed., Disp: , Rfl:   •  vitamin B-12 (CYANOCOBALAMIN) 100 MCG tablet, Take 100 mcg by mouth Daily., Disp: , Rfl:       Social History     Socioeconomic History   • Marital status:    Tobacco Use   • Smoking status: Former Smoker     Years: 15.00     Types: Electronic Cigarette     Start date: 1999     Quit date: 2015     Years since quittin.6   • Smokeless tobacco: Never Used   Substance and Sexual Activity   • Alcohol use: No     Comment: Daily caffeine use   • Drug use: No   • Sexual activity: Never         Review of Systems   Constitutional: Positive for malaise/fatigue.   HENT: Negative.    Eyes: Negative.    Cardiovascular: Negative.    Respiratory: Negative.    Endocrine: Negative.    Skin: Negative.    Musculoskeletal: Negative.    Gastrointestinal: Negative.    Neurological: Negative.    Psychiatric/Behavioral: Negative.        Procedures      ECG 12 Lead    Date/Time: 2022 4:28 PM  Performed by: Cory Rashid MD  Authorized by: Cory Rashid MD   Comparison: compared with previous ECG from 2021  Similar to previous ECG  Rhythm: sinus rhythm  Rate: normal  Conduction: left bundle branch block  QRS axis: normal                  Objective:    /90 (BP Location: Right arm, Patient Position: Sitting)   Pulse 94   Ht 170.2 cm (67\")   Wt 72.7 kg (160 lb 3.2 oz)   SpO2 98%   BMI 25.09 kg/m²         Vitals reviewed.   Constitutional:       Appearance: Well-developed.   Eyes:      Pupils: Pupils are equal, round, and reactive to light.   HENT:      Head: Normocephalic.   Neck:      Thyroid: No thyromegaly.      Vascular: No carotid bruit or JVD.   Pulmonary:      Effort: Pulmonary effort is normal.      Breath sounds: Normal breath sounds. "   Cardiovascular:      Normal rate. Regular rhythm. Normal S1. Normal S2.      Murmurs: There is no murmur.      No gallop. No click. No rub.   Pulses:     Intact distal pulses.   Edema:     Peripheral edema absent.   Abdominal:      General: Bowel sounds are normal.      Palpations: Abdomen is soft.   Musculoskeletal:      Cervical back: Normal range of motion. Skin:     General: Skin is warm and dry.      Findings: No erythema.   Neurological:      Mental Status: Alert and oriented to person, place, and time.             Assessment & Plan:       Diagnosis Plan   1. Cardiomyopathy, idiopathic (HCC)     2. Benign essential hypertension     3. Left bundle branch block       1.  Dilated nonischemic cardiomyopathy: We will plan on continuing his lisinopril.  He is class I NYHA symptoms.  Repeat echocardiogram at next visit next year  2.  Hypertension: Controlled  3.  Chronic left bundle branch block

## 2022-04-19 DIAGNOSIS — I42.9 CARDIOMYOPATHY, IDIOPATHIC: Primary | ICD-10-CM

## 2022-05-16 RX ORDER — HYDROXYZINE PAMOATE 25 MG/1
25 CAPSULE ORAL 3 TIMES DAILY PRN
Qty: 30 CAPSULE | Refills: 0 | Status: SHIPPED | OUTPATIENT
Start: 2022-05-16

## 2022-06-20 RX ORDER — LISINOPRIL 2.5 MG/1
TABLET ORAL
Qty: 90 TABLET | Refills: 3 | Status: SHIPPED | OUTPATIENT
Start: 2022-06-20 | End: 2022-11-02 | Stop reason: SDUPTHER

## 2022-06-20 NOTE — TELEPHONE ENCOUNTER
Please advise filling Lisinopril.  Did not meet protocol.           ACE Inhibitors Protocol Failed 06/20/2022 01:38 AM   Protocol Details  Normal serum potassium in past 12 months    Blood pressure on record    GFR greater than 30 mL/min in past 12 months          LOV   -    04/04/2022  Next   -   4/17/2023  Last labs   -   Nothing since 10/2020    Yuliet WALKER

## 2022-08-03 DIAGNOSIS — G62.9 NEUROPATHY: ICD-10-CM

## 2022-08-04 RX ORDER — GABAPENTIN 400 MG/1
CAPSULE ORAL
Qty: 90 CAPSULE | Refills: 0 | Status: SHIPPED | OUTPATIENT
Start: 2022-08-04 | End: 2023-01-06 | Stop reason: SDUPTHER

## 2022-08-26 ENCOUNTER — OFFICE VISIT (OUTPATIENT)
Dept: FAMILY MEDICINE CLINIC | Facility: CLINIC | Age: 42
End: 2022-08-26

## 2022-08-26 VITALS
DIASTOLIC BLOOD PRESSURE: 92 MMHG | RESPIRATION RATE: 18 BRPM | WEIGHT: 152 LBS | OXYGEN SATURATION: 97 % | HEIGHT: 67 IN | HEART RATE: 86 BPM | SYSTOLIC BLOOD PRESSURE: 126 MMHG | BODY MASS INDEX: 23.86 KG/M2

## 2022-08-26 DIAGNOSIS — E53.8 B12 DEFICIENCY: ICD-10-CM

## 2022-08-26 DIAGNOSIS — Z00.00 HEALTHCARE MAINTENANCE: Primary | ICD-10-CM

## 2022-08-26 PROCEDURE — 99396 PREV VISIT EST AGE 40-64: CPT | Performed by: FAMILY MEDICINE

## 2022-08-27 LAB
ALBUMIN SERPL-MCNC: 4.8 G/DL (ref 4–5)
ALBUMIN/GLOB SERPL: 2.7 {RATIO} (ref 1.2–2.2)
ALP SERPL-CCNC: 79 IU/L (ref 44–121)
ALT SERPL-CCNC: 19 IU/L (ref 0–44)
AST SERPL-CCNC: 20 IU/L (ref 0–40)
BILIRUB SERPL-MCNC: 0.7 MG/DL (ref 0–1.2)
BUN SERPL-MCNC: 11 MG/DL (ref 6–24)
BUN/CREAT SERPL: 17 (ref 9–20)
CALCIUM SERPL-MCNC: 9.2 MG/DL (ref 8.7–10.2)
CHLORIDE SERPL-SCNC: 101 MMOL/L (ref 96–106)
CHOLEST SERPL-MCNC: 190 MG/DL (ref 100–199)
CO2 SERPL-SCNC: 22 MMOL/L (ref 20–29)
CREAT SERPL-MCNC: 0.66 MG/DL (ref 0.76–1.27)
EGFRCR-CYS SERPLBLD CKD-EPI 2021: 121 ML/MIN/1.73
ERYTHROCYTE [DISTWIDTH] IN BLOOD BY AUTOMATED COUNT: 12.5 % (ref 11.6–15.4)
GLOBULIN SER CALC-MCNC: 1.8 G/DL (ref 1.5–4.5)
GLUCOSE SERPL-MCNC: 81 MG/DL (ref 65–99)
HCT VFR BLD AUTO: 40.7 % (ref 37.5–51)
HCV AB S/CO SERPL IA: <0.1 S/CO RATIO (ref 0–0.9)
HDLC SERPL-MCNC: 48 MG/DL
HGB BLD-MCNC: 13.8 G/DL (ref 13–17.7)
LDLC SERPL CALC-MCNC: 130 MG/DL (ref 0–99)
LDLC/HDLC SERPL: 2.7 RATIO (ref 0–3.6)
MCH RBC QN AUTO: 30.3 PG (ref 26.6–33)
MCHC RBC AUTO-ENTMCNC: 33.9 G/DL (ref 31.5–35.7)
MCV RBC AUTO: 90 FL (ref 79–97)
PLATELET # BLD AUTO: 229 X10E3/UL (ref 150–450)
POTASSIUM SERPL-SCNC: 4 MMOL/L (ref 3.5–5.2)
PROT SERPL-MCNC: 6.6 G/DL (ref 6–8.5)
RBC # BLD AUTO: 4.55 X10E6/UL (ref 4.14–5.8)
SODIUM SERPL-SCNC: 138 MMOL/L (ref 134–144)
TRIGL SERPL-MCNC: 66 MG/DL (ref 0–149)
VIT B12 SERPL-MCNC: 555 PG/ML (ref 232–1245)
VLDLC SERPL CALC-MCNC: 12 MG/DL (ref 5–40)
WBC # BLD AUTO: 6.1 X10E3/UL (ref 3.4–10.8)

## 2022-10-12 DIAGNOSIS — R11.0 CHRONIC NAUSEA: ICD-10-CM

## 2022-10-12 RX ORDER — ONDANSETRON HYDROCHLORIDE 8 MG/1
8 TABLET, FILM COATED ORAL EVERY 8 HOURS PRN
Qty: 20 TABLET | Refills: 1 | Status: SHIPPED | OUTPATIENT
Start: 2022-10-12 | End: 2023-03-01 | Stop reason: SDUPTHER

## 2022-11-02 ENCOUNTER — PATIENT MESSAGE (OUTPATIENT)
Dept: FAMILY MEDICINE CLINIC | Facility: CLINIC | Age: 42
End: 2022-11-02

## 2022-11-02 DIAGNOSIS — I10 BENIGN ESSENTIAL HYPERTENSION: Primary | ICD-10-CM

## 2022-11-02 RX ORDER — LISINOPRIL 2.5 MG/1
2.5 TABLET ORAL DAILY
Qty: 90 TABLET | Refills: 1 | Status: SHIPPED | OUTPATIENT
Start: 2022-11-02

## 2022-11-02 NOTE — TELEPHONE ENCOUNTER
From: Nick Beatty  To: Norah Newman MD  Sent: 11/2/2022 4:45 PM EDT  Subject: Lisinopril    Hey how do i go about filling this dr ana laura retired and no answers at the office a new dr has not been assigned yet in thier office to me. Can you send the prescription in? it is a small dose lisinopril 2.5 mg 90 day supply 1 time a day is dose.

## 2023-01-04 DIAGNOSIS — G62.9 NEUROPATHY: ICD-10-CM

## 2023-01-04 RX ORDER — GABAPENTIN 400 MG/1
400 CAPSULE ORAL EVERY EVENING
Qty: 90 CAPSULE | Refills: 0 | Status: CANCELLED | OUTPATIENT
Start: 2023-01-04

## 2023-01-06 DIAGNOSIS — G62.9 NEUROPATHY: ICD-10-CM

## 2023-01-06 RX ORDER — GABAPENTIN 400 MG/1
400 CAPSULE ORAL EVERY EVENING
Qty: 90 CAPSULE | Refills: 1 | Status: SHIPPED | OUTPATIENT
Start: 2023-01-06

## 2023-02-27 DIAGNOSIS — R11.0 CHRONIC NAUSEA: ICD-10-CM

## 2023-03-01 DIAGNOSIS — R11.0 CHRONIC NAUSEA: ICD-10-CM

## 2023-03-01 RX ORDER — ONDANSETRON HYDROCHLORIDE 8 MG/1
8 TABLET, FILM COATED ORAL EVERY 8 HOURS PRN
Qty: 20 TABLET | Refills: 3 | Status: SHIPPED | OUTPATIENT
Start: 2023-03-01

## 2023-03-02 RX ORDER — ONDANSETRON HYDROCHLORIDE 8 MG/1
TABLET, FILM COATED ORAL
Qty: 20 TABLET | Refills: 1 | OUTPATIENT
Start: 2023-03-02

## 2023-04-10 ENCOUNTER — HOSPITAL ENCOUNTER (OUTPATIENT)
Dept: CARDIOLOGY | Facility: HOSPITAL | Age: 43
Discharge: HOME | End: 2023-04-10
Admitting: INTERNAL MEDICINE
Payer: COMMERCIAL

## 2023-04-10 DIAGNOSIS — I42.9 CARDIOMYOPATHY, IDIOPATHIC: ICD-10-CM

## 2023-04-10 LAB
AORTIC ARCH: 3.2 CM
ASCENDING AORTA: 3.4 CM
BH CV ECHO MEAS - AO MAX PG: 8 MMHG
BH CV ECHO MEAS - AO MEAN PG: 4.3 MMHG
BH CV ECHO MEAS - AO ROOT DIAM: 3.3 CM
BH CV ECHO MEAS - AO V2 MAX: 141.8 CM/SEC
BH CV ECHO MEAS - AO V2 VTI: 22.9 CM
BH CV ECHO MEAS - AVA(I,D): 3.2 CM2
BH CV ECHO MEAS - EDV(CUBED): 103.5 ML
BH CV ECHO MEAS - EDV(MOD-SP2): 154 ML
BH CV ECHO MEAS - EDV(MOD-SP4): 174 ML
BH CV ECHO MEAS - EF(MOD-BP): 43.1 %
BH CV ECHO MEAS - EF(MOD-SP2): 42.9 %
BH CV ECHO MEAS - EF(MOD-SP4): 40.8 %
BH CV ECHO MEAS - ESV(CUBED): 50.4 ML
BH CV ECHO MEAS - ESV(MOD-SP2): 88 ML
BH CV ECHO MEAS - ESV(MOD-SP4): 103 ML
BH CV ECHO MEAS - FS: 21.3 %
BH CV ECHO MEAS - IVS/LVPW: 0.99 CM
BH CV ECHO MEAS - IVSD: 1.1 CM
BH CV ECHO MEAS - LAT PEAK E' VEL: 11.5 CM/SEC
BH CV ECHO MEAS - LV DIASTOLIC VOL/BSA (35-75): 96.8 CM2
BH CV ECHO MEAS - LV MASS(C)D: 187.5 GRAMS
BH CV ECHO MEAS - LV MAX PG: 4.8 MMHG
BH CV ECHO MEAS - LV MEAN PG: 2.6 MMHG
BH CV ECHO MEAS - LV SYSTOLIC VOL/BSA (12-30): 57.3 CM2
BH CV ECHO MEAS - LV V1 MAX: 109.8 CM/SEC
BH CV ECHO MEAS - LV V1 VTI: 18.5 CM
BH CV ECHO MEAS - LVIDD: 4.7 CM
BH CV ECHO MEAS - LVIDS: 3.7 CM
BH CV ECHO MEAS - LVOT AREA: 3.9 CM2
BH CV ECHO MEAS - LVOT DIAM: 2.24 CM
BH CV ECHO MEAS - LVPWD: 1.11 CM
BH CV ECHO MEAS - MED PEAK E' VEL: 10.7 CM/SEC
BH CV ECHO MEAS - MV DEC SLOPE: 963.8 CM/SEC2
BH CV ECHO MEAS - MV DEC TIME: 0.18 MSEC
BH CV ECHO MEAS - MV E MAX VEL: 96.6 CM/SEC
BH CV ECHO MEAS - MV MAX PG: 10.4 MMHG
BH CV ECHO MEAS - MV MEAN PG: 4.4 MMHG
BH CV ECHO MEAS - MV P1/2T: 48.6 MSEC
BH CV ECHO MEAS - MV V2 VTI: 25.4 CM
BH CV ECHO MEAS - MVA(P1/2T): 4.5 CM2
BH CV ECHO MEAS - MVA(VTI): 2.9 CM2
BH CV ECHO MEAS - PA ACC TIME: 0.1 SEC
BH CV ECHO MEAS - PA PR(ACCEL): 34.6 MMHG
BH CV ECHO MEAS - PA V2 MAX: 92.1 CM/SEC
BH CV ECHO MEAS - PULM A REVS DUR: 0.14 SEC
BH CV ECHO MEAS - PULM A REVS VEL: 32.3 CM/SEC
BH CV ECHO MEAS - PULM DIAS VEL: 41.8 CM/SEC
BH CV ECHO MEAS - PULM S/D: 1.39
BH CV ECHO MEAS - PULM SYS VEL: 58 CM/SEC
BH CV ECHO MEAS - QP/QS: 0.66
BH CV ECHO MEAS - RAP SYSTOLE: 3 MMHG
BH CV ECHO MEAS - RV MAX PG: 2.18 MMHG
BH CV ECHO MEAS - RV V1 MAX: 73.8 CM/SEC
BH CV ECHO MEAS - RV V1 VTI: 11.3 CM
BH CV ECHO MEAS - RVOT DIAM: 2.32 CM
BH CV ECHO MEAS - SI(MOD-SP2): 36.7 ML/M2
BH CV ECHO MEAS - SI(MOD-SP4): 39.5 ML/M2
BH CV ECHO MEAS - SUP REN AO DIAM: 1.7 CM
BH CV ECHO MEAS - SV(LVOT): 72.6 ML
BH CV ECHO MEAS - SV(MOD-SP2): 66 ML
BH CV ECHO MEAS - SV(MOD-SP4): 71 ML
BH CV ECHO MEAS - SV(RVOT): 47.7 ML
BH CV ECHO MEAS - TAPSE (>1.6): 2.6 CM
BH CV ECHO MEASUREMENTS AVERAGE E/E' RATIO: 8.7
BH CV XLRA - RV BASE: 3.1 CM
BH CV XLRA - RV LENGTH: 6.7 CM
BH CV XLRA - RV MID: 2.6 CM
BH CV XLRA - TDI S': 10.8 CM/SEC
LEFT ATRIUM VOLUME INDEX: 20.6 ML/M2
MAXIMAL PREDICTED HEART RATE: 179 BPM
SINUS: 3.2 CM
STJ: 2.8 CM
STRESS TARGET HR: 152 BPM

## 2023-04-10 PROCEDURE — 93306 TTE W/DOPPLER COMPLETE: CPT

## 2023-04-10 PROCEDURE — 93306 TTE W/DOPPLER COMPLETE: CPT | Performed by: INTERNAL MEDICINE

## 2023-04-11 VITALS
HEIGHT: 67 IN | DIASTOLIC BLOOD PRESSURE: 80 MMHG | HEART RATE: 88 BPM | BODY MASS INDEX: 23.84 KG/M2 | WEIGHT: 151.9 LBS | SYSTOLIC BLOOD PRESSURE: 139 MMHG

## 2023-04-20 ENCOUNTER — OFFICE VISIT (OUTPATIENT)
Dept: CARDIOLOGY | Facility: CLINIC | Age: 43
End: 2023-04-20
Payer: COMMERCIAL

## 2023-04-20 VITALS
WEIGHT: 157 LBS | SYSTOLIC BLOOD PRESSURE: 118 MMHG | DIASTOLIC BLOOD PRESSURE: 78 MMHG | HEART RATE: 93 BPM | HEIGHT: 67 IN | BODY MASS INDEX: 24.64 KG/M2

## 2023-04-20 DIAGNOSIS — I10 BENIGN ESSENTIAL HYPERTENSION: ICD-10-CM

## 2023-04-20 DIAGNOSIS — E78.5 HYPERLIPIDEMIA, UNSPECIFIED HYPERLIPIDEMIA TYPE: ICD-10-CM

## 2023-04-20 DIAGNOSIS — I44.7 LEFT BUNDLE BRANCH BLOCK: ICD-10-CM

## 2023-04-20 DIAGNOSIS — I42.9 CARDIOMYOPATHY, IDIOPATHIC: Primary | ICD-10-CM

## 2023-04-20 PROCEDURE — 99214 OFFICE O/P EST MOD 30 MIN: CPT | Performed by: NURSE PRACTITIONER

## 2023-04-20 PROCEDURE — 93000 ELECTROCARDIOGRAM COMPLETE: CPT | Performed by: NURSE PRACTITIONER

## 2023-04-20 RX ORDER — LISINOPRIL 2.5 MG/1
2.5 TABLET ORAL DAILY
Qty: 90 TABLET | Refills: 3 | Status: SHIPPED | OUTPATIENT
Start: 2023-04-20

## 2023-04-20 NOTE — PROGRESS NOTES
Date of Office Visit: 2023  Encounter Provider: ELIF Garcia  Place of Service: [facility] CARDIOLOGY  Patient Name: Nick Beatty  :1980    No chief complaint on file.  : follow up    HPI: Nick Beatty is a 42 y.o. male who is a patient of Dr. Rashid.  He has history of dilated nonischemic cardiomyopathy, hyperlipidemia, chronic left bundle branch block.  Echocardiogram dating back to  shows EF 40%.  On his last office visit with Dr. Rashid in 2022, patient was doing well.  He remains on low-dose lisinopril.  He is unable to tolerate beta-blocker due to bradycardia.  He denied any shortness of breath, edema or chest discomfort.  He did occasionally have some fatigue.    Recent echocardiogram 4/10/2023 shows LVEF 43%, grade 1 diastolic dysfunction and trace mitral regurgitation.  Most recent lipid panel shows .     Mr. Beatty has had no issues.  He has no complaints of chest pain, pressure, shortness of air, edema or lightheadedness.  EKG is stable.  Blood pressure remains well controlled.    Previous testing and notes have been reviewed by me.   Past Medical History:   Diagnosis Date   • Anxiety    • Arthritis    • Benign essential hypertension    • Cardiomyopathy    • History of bronchitis    • Irritable bowel syndrome    • Left bundle branch block     7-8 years ago   • Nausea and vomiting    • Visual impairment        Past Surgical History:   Procedure Laterality Date   • EYE SURGERY         Social History     Socioeconomic History   • Marital status:    Tobacco Use   • Smoking status: Former     Types: Electronic Cigarette     Start date: 1999     Quit date: 2015     Years since quittin.7   • Smokeless tobacco: Never   Substance and Sexual Activity   • Alcohol use: No     Comment: Daily caffeine use   • Drug use: No   • Sexual activity: Never       Family History   Problem Relation Age of Onset   • Ovarian cancer Mother   "  • Cancer Mother    • Congenital heart disease Father    • Cancer Father    • Heart disease Father    • Hyperlipidemia Father    • Nephrolithiasis Father    • Stroke Father    • Heart disease Paternal Grandfather    • Hyperlipidemia Paternal Grandfather        Review of Systems   Constitutional: Negative.   HENT: Negative.    Eyes: Negative.    Cardiovascular: Negative.    Respiratory: Negative.    Endocrine: Negative.    Hematologic/Lymphatic: Negative.    Skin: Negative.    Musculoskeletal: Negative.    Gastrointestinal: Negative.    Genitourinary: Negative.    Neurological: Negative.    Psychiatric/Behavioral: Negative.    Allergic/Immunologic: Negative.        No Known Allergies      Current Outpatient Medications:   •  Cholecalciferol 50 MCG (2000 UT) tablet, Take  by mouth Daily., Disp: , Rfl:   •  gabapentin (NEURONTIN) 400 MG capsule, Take 1 capsule by mouth Every Evening., Disp: 90 capsule, Rfl: 1  •  hydrOXYzine pamoate (VISTARIL) 25 MG capsule, TAKE 1 CAPSULE BY MOUTH 3 (THREE) TIMES A DAY AS NEEDED FOR ITCHING., Disp: 30 capsule, Rfl: 0  •  lisinopril (PRINIVIL,ZESTRIL) 2.5 MG tablet, Take 1 tablet by mouth Daily., Disp: 90 tablet, Rfl: 3  •  meclizine (ANTIVERT) 12.5 MG tablet, TAKE 1 TABLET BY MOUTH TWICE A DAY, Disp: 60 tablet, Rfl: 5  •  MULTIPLE VITAMIN PO, Take  by mouth Daily., Disp: , Rfl:   •  ondansetron (ZOFRAN) 8 MG tablet, Take 1 tablet by mouth Every 8 (Eight) Hours As Needed for Nausea or Vomiting., Disp: 20 tablet, Rfl: 3  •  promethazine (PHENERGAN) 25 MG tablet, Take 12.5 mg by mouth Every 8 (Eight) Hours As Needed., Disp: , Rfl:   •  vitamin B-12 (CYANOCOBALAMIN) 100 MCG tablet, Take 1 tablet by mouth Daily., Disp: , Rfl:       Objective:     Vitals:    04/20/23 1455   BP: 118/78   Pulse: 93   Weight: 71.2 kg (157 lb)   Height: 170 cm (66.93\")     Body mass index is 24.64 kg/m².    PHYSICAL EXAM:    Constitutional:       Appearance: Healthy appearance. Not in distress.   Neck:      " Vascular: No JVR. JVD normal.   Pulmonary:      Effort: Pulmonary effort is normal.      Breath sounds: Normal breath sounds. No wheezing. No rhonchi. No rales.   Chest:      Chest wall: Not tender to palpatation.   Cardiovascular:      PMI at left midclavicular line. Normal rate. Regular rhythm. Normal S1. Normal S2.      Murmurs: There is no murmur.      No gallop. No click. No rub.   Pulses:     Intact distal pulses.   Edema:     Peripheral edema absent.   Abdominal:      General: Bowel sounds are normal.      Palpations: Abdomen is soft.      Tenderness: There is no abdominal tenderness.   Musculoskeletal: Normal range of motion.         General: No tenderness. Skin:     General: Skin is warm and dry.   Neurological:      General: No focal deficit present.      Mental Status: Alert and oriented to person, place and time.           ECG 12 Lead    Date/Time: 4/20/2023 4:32 PM  Performed by: Vee Zafar APRN  Authorized by: Vee Zafar APRN   Comparison: compared with previous ECG from 4/4/2022  Similar to previous ECG  Rhythm: sinus rhythm  Rate: normal  BPM: 93  Conduction: left bundle branch block  Conduction comments: chronic                Assessment:       Diagnosis Plan   1. Cardiomyopathy, idiopathic        2. Benign essential hypertension  lisinopril (PRINIVIL,ZESTRIL) 2.5 MG tablet      3. Left bundle branch block        4. Hyperlipidemia, unspecified hyperlipidemia type          Orders Placed This Encounter   Procedures   • ECG 12 Lead     This order was created via procedure documentation     Order Specific Question:   Release to patient     Answer:   Routine Release          Plan:       1.  Nonischemic cardiomyopathy: EF 43%.  Stable since 2013.  Euvolemic. Does not tolerate beat blockers d/t bradycardia.   2.  Left bundle branch block: chronic  3. Hyperlipidemia: followed by PCP    Mr. Beatty will follow up with me in one year.  HE will call sooner for any questiona or concerns.           Your medication list          Accurate as of April 20, 2023  4:33 PM. If you have any questions, ask your nurse or doctor.            CONTINUE taking these medications      Instructions Last Dose Given Next Dose Due   Cholecalciferol 50 MCG (2000 UT) tablet      Take  by mouth Daily.       gabapentin 400 MG capsule  Commonly known as: NEURONTIN      Take 1 capsule by mouth Every Evening.       hydrOXYzine pamoate 25 MG capsule  Commonly known as: VISTARIL      TAKE 1 CAPSULE BY MOUTH 3 (THREE) TIMES A DAY AS NEEDED FOR ITCHING.       lisinopril 2.5 MG tablet  Commonly known as: PRINIVIL,ZESTRIL      Take 1 tablet by mouth Daily.       meclizine 12.5 MG tablet  Commonly known as: ANTIVERT      TAKE 1 TABLET BY MOUTH TWICE A DAY       multivitamin tablet tablet  Commonly known as: THERAGRAN      Take  by mouth Daily.       ondansetron 8 MG tablet  Commonly known as: ZOFRAN      Take 1 tablet by mouth Every 8 (Eight) Hours As Needed for Nausea or Vomiting.       promethazine 25 MG tablet  Commonly known as: PHENERGAN      Take 12.5 mg by mouth Every 8 (Eight) Hours As Needed.       vitamin B-12 100 MCG tablet  Commonly known as: CYANOCOBALAMIN      Take 1 tablet by mouth Daily.             Where to Get Your Medications      These medications were sent to [facility]/pharmacy #[id number] - [place], [place] - [address] [facility] [phone] [facility] [phone]   [address], [place] [id number]    Hours: 24-hours Phone: [phone]   · lisinopril 2.5 MG tablet           As always, it has been a pleasure to participate in your patient's care.      Sincerely,       ELIF Segundo

## 2023-11-09 ENCOUNTER — PATIENT MESSAGE (OUTPATIENT)
Dept: FAMILY MEDICINE CLINIC | Facility: CLINIC | Age: 43
End: 2023-11-09
Payer: COMMERCIAL

## 2023-11-09 DIAGNOSIS — F40.243 FEAR OF FLYING: Primary | ICD-10-CM

## 2023-11-10 RX ORDER — LORAZEPAM 0.5 MG/1
0.5 TABLET ORAL EVERY 8 HOURS PRN
Qty: 5 TABLET | Refills: 0 | Status: SHIPPED | OUTPATIENT
Start: 2023-11-10

## 2023-11-10 NOTE — TELEPHONE ENCOUNTER
From: Nick Beatty  To: Norah Newman  Sent: 11/9/2023 3:09 PM EST  Subject: Anxiety med    Yes I was wondering if you thought I would need a stronger med for anxiety. Reason being at the end of month I am traveling on an airplane for the first time in 20 plus years. I am nervous about it not asking for a lot just like 4 pills for the night before to sleep day of flight there then for night before flight back and then the flight back. I have the ones you gave me previously that I take as needed but they don't really do much anymore.

## 2023-12-07 ENCOUNTER — OFFICE VISIT (OUTPATIENT)
Dept: FAMILY MEDICINE CLINIC | Facility: CLINIC | Age: 43
End: 2023-12-07
Payer: COMMERCIAL

## 2023-12-07 VITALS
BODY MASS INDEX: 25.55 KG/M2 | HEART RATE: 100 BPM | SYSTOLIC BLOOD PRESSURE: 142 MMHG | HEIGHT: 66 IN | RESPIRATION RATE: 18 BRPM | DIASTOLIC BLOOD PRESSURE: 92 MMHG | OXYGEN SATURATION: 98 % | WEIGHT: 159 LBS

## 2023-12-07 DIAGNOSIS — L03.012 PARONYCHIA OF FINGER OF LEFT HAND: Primary | ICD-10-CM

## 2023-12-07 DIAGNOSIS — I10 BENIGN ESSENTIAL HYPERTENSION: ICD-10-CM

## 2023-12-07 RX ORDER — CEPHALEXIN 500 MG/1
500 CAPSULE ORAL 3 TIMES DAILY
Qty: 21 CAPSULE | Refills: 0 | Status: SHIPPED | OUTPATIENT
Start: 2023-12-07 | End: 2023-12-14

## 2023-12-07 RX ORDER — LISINOPRIL 2.5 MG/1
2.5 TABLET ORAL DAILY
Qty: 90 TABLET | Refills: 3 | Status: SHIPPED | OUTPATIENT
Start: 2023-12-07

## 2023-12-07 NOTE — PROGRESS NOTES
Subjective   Nick Beatty is a 43 y.o. male.     Hand Pain        Est pt Dr Newman  New to this provider    Acute pain, swelling, heat around Left third finger nailbed. No hx of MRSA. No fever. He has tried to drain this and worse over past 3 days. No better with soaking.     Chronic htn x years. Managed on lisinopril 2.5 qd. Needs refill. Denies chest pain, palps, headache or vision changes. Bp 142/92, pt thinks this is due to pain.       The following portions of the patient's history were reviewed and updated as appropriate: allergies, current medications, past family history, past medical history, past social history, past surgical history and problem list.    Review of Systems    Objective   Physical Exam  Vitals reviewed.   Constitutional:       Appearance: Normal appearance.   HENT:      Mouth/Throat:      Mouth: Mucous membranes are moist.   Cardiovascular:      Rate and Rhythm: Normal rate and regular rhythm.      Pulses: Normal pulses.      Heart sounds: Normal heart sounds.   Pulmonary:      Effort: Pulmonary effort is normal.      Breath sounds: Normal breath sounds.   Skin:     General: Skin is warm.      Findings: Erythema present.             Comments: cuticle of L middle finger, green purulent dng, + heat, + erythema to 1st knuckle   Neurological:      General: No focal deficit present.      Mental Status: He is alert.         Vitals:    12/07/23 1538   BP: 142/92   Pulse: 100   Resp: 18   SpO2: 98%     Body mass index is 25.66 kg/m².    Incision & Drainage    Date/Time: 12/7/2023 5:05 PM    Performed by: Yris Bustillo APRN  Authorized by: Yris Bustillo APRN  Consent given by: patient  Patient understanding: patient states understanding of the procedure being performed  Patient identity confirmed: verbally with patient  Type: abscess  Body area: upper extremity  Location details: left long finger  Needle gauge: 22  Complexity: simple  Drainage: purulent  Drainage amount: scant          Assessment &  Plan   Problems Addressed this Visit    None  Visit Diagnoses       Paronychia of finger of left hand    -  Primary    Relevant Medications    mupirocin (BACTROBAN) 2 % ointment    cephalexin (Keflex) 500 MG capsule    Benign essential hypertension        Relevant Medications    lisinopril (PRINIVIL,ZESTRIL) 2.5 MG tablet          Diagnoses         Codes Comments    Paronychia of finger of left hand    -  Primary ICD-10-CM: L03.012  ICD-9-CM: 681.02     Benign essential hypertension     ICD-10-CM: I10  ICD-9-CM: 401.1             Paronchychia- drained, rx mupirocin 2% bid, soak bid, rx keflex 500 tid x 7 days  Call if worsening    Htn- rf lisinopril, follow heart heathy diet, low salt diet       Education provided in AVS   No follow-ups on file.

## 2024-03-11 DIAGNOSIS — G62.9 NEUROPATHY: ICD-10-CM

## 2024-03-12 RX ORDER — GABAPENTIN 400 MG/1
400 CAPSULE ORAL EVERY EVENING
Qty: 90 CAPSULE | Refills: 1 | Status: SHIPPED | OUTPATIENT
Start: 2024-03-12

## 2024-10-25 DIAGNOSIS — R11.0 CHRONIC NAUSEA: ICD-10-CM

## 2024-10-25 DIAGNOSIS — G62.9 NEUROPATHY: ICD-10-CM

## 2024-10-25 DIAGNOSIS — I10 BENIGN ESSENTIAL HYPERTENSION: ICD-10-CM

## 2024-10-25 RX ORDER — LISINOPRIL 2.5 MG/1
2.5 TABLET ORAL DAILY
Qty: 30 TABLET | Refills: 1 | Status: SHIPPED | OUTPATIENT
Start: 2024-10-25

## 2024-10-25 RX ORDER — ONDANSETRON 8 MG/1
8 TABLET, FILM COATED ORAL EVERY 8 HOURS PRN
Qty: 20 TABLET | Refills: 3 | Status: SHIPPED | OUTPATIENT
Start: 2024-10-25

## 2024-10-25 RX ORDER — GABAPENTIN 400 MG/1
400 CAPSULE ORAL EVERY EVENING
Qty: 90 CAPSULE | Refills: 1 | OUTPATIENT
Start: 2024-10-25

## 2024-10-25 RX ORDER — GABAPENTIN 400 MG/1
400 CAPSULE ORAL EVERY EVENING
Qty: 90 CAPSULE | Refills: 1 | Status: SHIPPED | OUTPATIENT
Start: 2024-10-25

## 2025-01-19 DIAGNOSIS — I10 BENIGN ESSENTIAL HYPERTENSION: ICD-10-CM

## 2025-01-20 RX ORDER — LISINOPRIL 2.5 MG/1
2.5 TABLET ORAL DAILY
Qty: 90 TABLET | Refills: 3 | OUTPATIENT
Start: 2025-01-20

## 2025-01-22 DIAGNOSIS — I10 BENIGN ESSENTIAL HYPERTENSION: ICD-10-CM

## 2025-01-22 RX ORDER — LISINOPRIL 2.5 MG/1
2.5 TABLET ORAL DAILY
Qty: 90 TABLET | Refills: 0 | Status: SHIPPED | OUTPATIENT
Start: 2025-01-22

## 2025-01-22 NOTE — TELEPHONE ENCOUNTER
Last visit 12/27/23  No f/u scheduled    Patient states he is very busy at work and cannot schedule an appt right now asking for 90 day supply to be sent until he can get in

## 2025-04-19 DIAGNOSIS — I10 BENIGN ESSENTIAL HYPERTENSION: ICD-10-CM

## 2025-04-21 RX ORDER — LISINOPRIL 2.5 MG/1
2.5 TABLET ORAL DAILY
Qty: 90 TABLET | Refills: 0 | OUTPATIENT
Start: 2025-04-21

## 2025-05-23 ENCOUNTER — OFFICE VISIT (OUTPATIENT)
Dept: FAMILY MEDICINE CLINIC | Facility: CLINIC | Age: 45
End: 2025-05-23
Payer: COMMERCIAL

## 2025-05-23 VITALS
DIASTOLIC BLOOD PRESSURE: 80 MMHG | OXYGEN SATURATION: 98 % | WEIGHT: 171 LBS | HEART RATE: 80 BPM | BODY MASS INDEX: 27.48 KG/M2 | SYSTOLIC BLOOD PRESSURE: 130 MMHG | RESPIRATION RATE: 16 BRPM | HEIGHT: 66 IN

## 2025-05-23 DIAGNOSIS — Z13.89 ENCOUNTER FOR SCREENING FOR OTHER DISORDER: Primary | ICD-10-CM

## 2025-05-23 DIAGNOSIS — G62.9 NEUROPATHY: ICD-10-CM

## 2025-05-23 DIAGNOSIS — E78.2 MIXED HYPERLIPIDEMIA: ICD-10-CM

## 2025-05-23 DIAGNOSIS — I10 BENIGN ESSENTIAL HYPERTENSION: ICD-10-CM

## 2025-05-23 DIAGNOSIS — E53.8 B12 DEFICIENCY: ICD-10-CM

## 2025-05-23 RX ORDER — LISINOPRIL 2.5 MG/1
2.5 TABLET ORAL DAILY
Qty: 90 TABLET | Refills: 1 | Status: SHIPPED | OUTPATIENT
Start: 2025-05-23

## 2025-05-23 RX ORDER — GABAPENTIN 400 MG/1
400 CAPSULE ORAL EVERY EVENING
Qty: 90 CAPSULE | Refills: 1 | Status: SHIPPED | OUTPATIENT
Start: 2025-05-23

## 2025-05-23 NOTE — PROGRESS NOTES
Assessment & Plan          Encounter for screening for other disorder    Orders:    CBC (No Diff)    Comprehensive Metabolic Panel    Lipid Panel With LDL / HDL Ratio    B12 deficiency    Orders:    Vitamin B12    Neuropathy    Orders:    gabapentin (NEURONTIN) 400 MG capsule; Take 1 capsule by mouth Every Evening.    Benign essential hypertension      Orders:    lisinopril (PRINIVIL,ZESTRIL) 2.5 MG tablet; Take 1 tablet by mouth Daily.    Mixed hyperlipidemia   Lipids today            Patient was given instructions and counseling regarding his condition or for health maintenance advice. Please see specific information pulled into the AVS if appropriate.          Nick is a 44 y.o. being seen today for  Hyperlipidemia and Hypertension   HISTORY      Onset was at an unknown time.   Pertinent negative symptoms include no abdominal pain, no anorexia, no joint pain, no change in stool, no chest pain, no chills, no congestion, no cough, no diaphoresis, no fatigue, no fever, no headaches, no joint swelling, no myalgias, no nausea, no neck pain, no numbness, no rash, no sore throat, no swollen glands, no dysuria, no vertigo, no visual change, no vomiting and no weakness.   Treatment and/or Medications comments include: No issues   Additional information: No issues        He reports overall good health with no significant issues. However, he has observed a recent weight gain, which he attributes to his current lifestyle. He has been actively trying to lose weight through various physical activities, including cycling, rowing, and boxing. He has not been fasting today.     Social History  He  reports that he quit smoking about 9 years ago. His smoking use included electronic cigarette. He started smoking about 25 years ago. He has never used smokeless tobacco. He reports that he does not drink alcohol and does not use drugs.  EXAM DATA    Vital Signs        BP Readings from Last 1 Encounters:   05/23/25 130/80     Wt  Readings from Last 3 Encounters:   05/23/25 77.6 kg (171 lb)   12/07/23 72.1 kg (159 lb)   04/20/23 71.2 kg (157 lb)   Body mass index is 27.6 kg/m².  Physical Exam      General Appearance: Gaining weight.  HEENT:   Respiratory: Within normal limits.  Cardiovascular: Regular rate and rhythm, no murmurs, rubs, or gallops.  Skin: Warm and dry, no rash.  Psychiatric: Normal judgement and affect.              Patient or patient representative verbalized consent for the use of Ambient Listening during the visit with  Norah Newman MD for chart documentation. 5/23/2025  15:06 EDT

## 2025-05-24 PROBLEM — E78.2 MIXED HYPERLIPIDEMIA: Status: ACTIVE | Noted: 2021-08-05

## 2025-05-24 LAB
ALBUMIN SERPL-MCNC: 4.4 G/DL (ref 3.5–5.2)
ALBUMIN/GLOB SERPL: 1.9 G/DL
ALP SERPL-CCNC: 85 U/L (ref 39–117)
ALT SERPL-CCNC: 32 U/L (ref 1–41)
AST SERPL-CCNC: 28 U/L (ref 1–40)
BILIRUB SERPL-MCNC: 0.4 MG/DL (ref 0–1.2)
BUN SERPL-MCNC: 12 MG/DL (ref 6–20)
BUN/CREAT SERPL: 17.6 (ref 7–25)
CALCIUM SERPL-MCNC: 9.1 MG/DL (ref 8.6–10.5)
CHLORIDE SERPL-SCNC: 102 MMOL/L (ref 98–107)
CHOLEST SERPL-MCNC: 198 MG/DL (ref 0–200)
CO2 SERPL-SCNC: 25.1 MMOL/L (ref 22–29)
CREAT SERPL-MCNC: 0.68 MG/DL (ref 0.76–1.27)
EGFRCR SERPLBLD CKD-EPI 2021: 118.3 ML/MIN/1.73
ERYTHROCYTE [DISTWIDTH] IN BLOOD BY AUTOMATED COUNT: 11.8 % (ref 12.3–15.4)
GLOBULIN SER CALC-MCNC: 2.3 GM/DL
GLUCOSE SERPL-MCNC: 96 MG/DL (ref 65–99)
HCT VFR BLD AUTO: 38.3 % (ref 37.5–51)
HDLC SERPL-MCNC: 56 MG/DL (ref 40–60)
HGB BLD-MCNC: 13.5 G/DL (ref 13–17.7)
LDLC SERPL CALC-MCNC: 128 MG/DL (ref 0–100)
LDLC/HDLC SERPL: 2.26 {RATIO}
MCH RBC QN AUTO: 30.5 PG (ref 26.6–33)
MCHC RBC AUTO-ENTMCNC: 35.2 G/DL (ref 31.5–35.7)
MCV RBC AUTO: 86.7 FL (ref 79–97)
PLATELET # BLD AUTO: 252 10*3/MM3 (ref 140–450)
POTASSIUM SERPL-SCNC: 4.5 MMOL/L (ref 3.5–5.2)
PROT SERPL-MCNC: 6.7 G/DL (ref 6–8.5)
RBC # BLD AUTO: 4.42 10*6/MM3 (ref 4.14–5.8)
SODIUM SERPL-SCNC: 139 MMOL/L (ref 136–145)
TRIGL SERPL-MCNC: 76 MG/DL (ref 0–150)
VIT B12 SERPL-MCNC: 599 PG/ML (ref 211–946)
VLDLC SERPL CALC-MCNC: 14 MG/DL (ref 5–40)
WBC # BLD AUTO: 6.64 10*3/MM3 (ref 3.4–10.8)

## 2025-07-14 DIAGNOSIS — G62.9 NEUROPATHY: ICD-10-CM

## 2025-07-14 DIAGNOSIS — R42 DIZZINESS: ICD-10-CM

## 2025-07-14 DIAGNOSIS — R11.0 CHRONIC NAUSEA: ICD-10-CM

## 2025-07-14 DIAGNOSIS — I10 BENIGN ESSENTIAL HYPERTENSION: ICD-10-CM

## 2025-07-14 DIAGNOSIS — F41.8 SITUATIONAL ANXIETY: Primary | ICD-10-CM

## 2025-07-14 RX ORDER — PROMETHAZINE HYDROCHLORIDE 25 MG/1
12.5 TABLET ORAL EVERY 8 HOURS PRN
Qty: 25 TABLET | Refills: 1 | Status: SHIPPED | OUTPATIENT
Start: 2025-07-14

## 2025-07-14 RX ORDER — GABAPENTIN 400 MG/1
400 CAPSULE ORAL EVERY EVENING
Qty: 90 CAPSULE | Refills: 1 | Status: SHIPPED | OUTPATIENT
Start: 2025-07-14

## 2025-07-14 RX ORDER — MECLIZINE HCL 12.5 MG 12.5 MG/1
12.5 TABLET ORAL 2 TIMES DAILY
Qty: 60 TABLET | Refills: 5 | Status: SHIPPED | OUTPATIENT
Start: 2025-07-14

## 2025-07-14 RX ORDER — ONDANSETRON 8 MG/1
8 TABLET, FILM COATED ORAL EVERY 8 HOURS PRN
Qty: 20 TABLET | Refills: 3 | Status: SHIPPED | OUTPATIENT
Start: 2025-07-14

## 2025-07-14 RX ORDER — LISINOPRIL 2.5 MG/1
2.5 TABLET ORAL DAILY
Qty: 90 TABLET | Refills: 1 | Status: SHIPPED | OUTPATIENT
Start: 2025-07-14

## 2025-07-14 RX ORDER — HYDROXYZINE PAMOATE 25 MG/1
25 CAPSULE ORAL 3 TIMES DAILY PRN
Qty: 30 CAPSULE | Refills: 0 | Status: SHIPPED | OUTPATIENT
Start: 2025-07-14